# Patient Record
Sex: MALE | Race: WHITE | Employment: OTHER | RURAL
[De-identification: names, ages, dates, MRNs, and addresses within clinical notes are randomized per-mention and may not be internally consistent; named-entity substitution may affect disease eponyms.]

---

## 2017-05-25 ENCOUNTER — OFFICE VISIT (OUTPATIENT)
Dept: FAMILY MEDICINE CLINIC | Age: 82
End: 2017-05-25

## 2017-05-25 VITALS
SYSTOLIC BLOOD PRESSURE: 122 MMHG | WEIGHT: 146 LBS | RESPIRATION RATE: 18 BRPM | BODY MASS INDEX: 24.32 KG/M2 | HEIGHT: 65 IN | OXYGEN SATURATION: 98 % | HEART RATE: 69 BPM | TEMPERATURE: 97.2 F | DIASTOLIC BLOOD PRESSURE: 70 MMHG

## 2017-05-25 DIAGNOSIS — Z00.00 ROUTINE GENERAL MEDICAL EXAMINATION AT A HEALTH CARE FACILITY: ICD-10-CM

## 2017-05-25 DIAGNOSIS — M79.604 RIGHT LEG PAIN: Primary | ICD-10-CM

## 2017-05-25 DIAGNOSIS — Z13.39 SCREENING FOR ALCOHOLISM: ICD-10-CM

## 2017-05-25 DIAGNOSIS — I10 ESSENTIAL HYPERTENSION: Chronic | ICD-10-CM

## 2017-05-25 NOTE — MR AVS SNAPSHOT
Visit Information Date & Time Provider Department Dept. Phone Encounter #  
 5/25/2017  3:20 PM Jennifer Uribe MD [facility] Primary Care [phone] [id number] Upcoming Health Maintenance Date Due  
 EYE EXAM RETINAL OR DILATED Q1 3/20/1935 HEMOGLOBIN A1C Q6M 3/15/2016 MICROALBUMIN Q1 9/15/2016 LIPID PANEL Q1 9/15/2016 Pneumococcal 65+ High/Highest Risk (2 of 2 - PPSV23) 7/20/2017 INFLUENZA AGE 9 TO ADULT 8/1/2017 FOOT EXAM Q1 5/25/2018 MEDICARE YEARLY EXAM 5/26/2018 GLAUCOMA SCREENING Q2Y 5/25/2019 DTaP/Tdap/Td series (2 - Td) 5/25/2027 Allergies as of 5/25/2017  Review Complete On: 5/25/2017 By: Jennifer Uribe MD  
 No Known Allergies Current Immunizations  Never Reviewed No immunizations on file. Not reviewed this visit You Were Diagnosed With   
  
 Codes Comments Right leg pain    -  Primary ICD-10-CM: M79.604 ICD-9-CM: 729.5 Routine general medical examination at a health care facility     ICD-10-CM: Z00.00 ICD-9-CM: V70.0 Screening for alcoholism     ICD-10-CM: Z13.89 ICD-9-CM: V79.1 Essential hypertension     ICD-10-CM: I10 
ICD-9-CM: 401.9 Solitary left kidney     ICD-10-CM: Q60.0 ICD-9-CM: 753.0 Vitals BP Pulse Temp Resp Height(growth percentile) Weight(growth percentile) 122/70 69 97.2 °F (36.2 °C) 18 5' 5\" (1.651 m) 146 lb (66.2 kg) SpO2 BMI Smoking Status 98% 24.3 kg/m2 Never Smoker BMI and BSA Data Body Mass Index Body Surface Area  
 24.3 kg/m 2 1.74 m 2 Preferred Pharmacy Pharmacy Name Phone [facility] [address] [facility], [address] [place] [address] Adrian Rudd 101 Your Updated Medication List  
  
   
This list is accurate as of: 5/25/17  3:42 PM.  Always use your most recent med list.  
  
  
  
  
 aspirin 81 mg tablet Take 81 mg by mouth daily. azithromycin 250 mg tablet Commonly known as:  Yokasta Rome Take 2 tablets today, then take 1 tablet daily  
  
 finasteride 5 mg tablet Commonly known as:  PROSCAR Take 1 Tab by mouth daily. lisinopril-hydroCHLOROthiazide 20-12.5 mg per tablet Commonly known as:  Deena Rower Take 1 Tab by mouth daily. Needs follow up and lab We Performed the Following METABOLIC PANEL, COMPREHENSIVE [53961 CPT(R)] SED RATE (ESR) [id number] CPT(R)] To-Do List   
 Around 05/25/2017 Imaging:  XR HIP RT W OR WO PELV 2-3 VWS Introducing hospitals & HEALTH SERVICES! Rhodachilo Figueroa introduces [employer] patient portal. Now you can access parts of your medical record, email your doctor's office, and request medication refills online. 1. In your internet browser, go to [email or web address] 2. Click on the First Time User? Click Here link in the Sign In box. You will see the New Member Sign Up page. 3. Enter your [employer] Access Code exactly as it appears below. You will not need to use this code after youve completed the sign-up process. If you do not sign up before the expiration date, you must request a new code. · [employer] Access Code: [email or web address] Expires: 6/19/2017  1:57 PM 
 
4. Enter the last four digits of your Social Security Number (xxxx) and Date of Birth (mm/dd/yyyy) as indicated and click Submit. You will be taken to the next sign-up page. 5. Create a [employer] ID. This will be your [employer] login ID and cannot be changed, so think of one that is secure and easy to remember. 6. Create a [employer] password. You can change your password at any time. 7. Enter your Password Reset Question and Answer. This can be used at a later time if you forget your password. 8. Enter your e-mail address. You will receive e-mail notification when new information is available in [address] 9. Click Sign Up. You can now view and download portions of your medical record. 10. Click the Download Summary menu link to download a portable copy of your medical information. If you have questions, please visit the Frequently Asked Questions section of the [employer] website. Remember, [employer] is NOT to be used for urgent needs. For medical emergencies, dial 911. Now available from your iPhone and Android! Please provide this summary of care documentation to your next provider. Your primary care clinician is listed as Rosa Maria Burger. If you have any questions after today's visit, please call [phone].

## 2017-05-25 NOTE — PROGRESS NOTES
This is an Initial Medicare Annual Wellness Exam (AWV) (Performed 12 months after IPPE or effective date of Medicare Part B enrollment, Once in a lifetime)    I have reviewed the patient's medical history in detail and updated the computerized patient record. History     Past Medical History:   Diagnosis Date    BPH (benign prostatic hyperplasia)     Diabetes ([facility] [address]     Hypertension     Left renal mass - renal cell Ca - removed kidney       Past Surgical History:   Procedure Laterality Date    HX CATARACT REMOVAL      bilat    HX CYST REMOVAL      right chest.    HX NEPHRECTOMY  2009    Dr Anh Stewart, left renal CA     Current Outpatient Prescriptions   Medication Sig Dispense Refill    lisinopril-hydroCHLOROthiazide (PRINZIDE, ZESTORETIC) 20-12.5 mg per tablet Take 1 Tab by mouth daily. Needs follow up and lab 30 Tab 0    finasteride (PROSCAR) 5 mg tablet Take 1 Tab by mouth daily. 30 Tab 5    aspirin 81 mg tablet Take 81 mg by mouth daily.  azithromycin (ZITHROMAX) 250 mg tablet Take 2 tablets today, then take 1 tablet daily 6 Tab 0     No Known Allergies  Family History   Problem Relation Age of Onset    Stroke Father      Social History   Substance Use Topics    Smoking status: Never Smoker    Smokeless tobacco: Not on file    Alcohol use No     Patient Active Problem List   Diagnosis Code    HTN (hypertension) I10    Hyperlipidemia E78.5         Depression Risk Factor Screening:     PHQ over the last two weeks 10/27/2015   Little interest or pleasure in doing things Not at all   Feeling down, depressed or hopeless Not at all   Total Score PHQ 2 0     Alcohol Risk Factor Screening: On any occasion during the past 3 months, have you had more than 4 drinks containing alcohol? No    Do you average more than 14 drinks per week? No    Functional Ability and Level of Safety:     Functional Ability:   Does the patient exhibit a steady gait?   {gen no default/yes/free text:[id number]::yes How long did it take the patient to get up and walk from a sitting position? 2sec   Is the patient self reliant?  (ie can do own laundry, meals, household chores)  yes     Does the patient handle his/her own medications? yes     Does the patient handle his/her own money? yes     Is the patients home safe (ie good lighting, handrails on stairs and bath, etc.)? yes     Did you notice or did patient express any hearing difficulties? no     Did you notice or did patient express any vision difficulties?   no     Were distance and reading eye charts used? no       Advance Care Planning:   Patient was offered the opportunity to discuss advance care planning:  yes     Does patient have an Advance Directive:  yes   If no, did you provide information on Caring Connections? yes     ADL Assessment 5/25/2017   Feeding yourself No Help Needed   Getting from bed to chair No Help Needed   Getting dressed No Help Needed   Bathing or showering No Help Needed   Walk across the room (includes cane/walker) No Help Needed   Using the telphone No Help Needed   Taking your medications No Help Needed   Preparing meals No Help Needed   Managing money (expenses/bills) No Help Needed   Moderately strenuous housework (laundry) No Help Needed   Shopping for personal items (toiletries/medicines) No Help Needed   Shopping for groceries No Help Needed   Driving No Help Needed   Climbing a flight of stairs No Help Needed   Getting to places beyond walking distances No Help Needed         Hearing Loss   None to minor    Activities of Daily Living   Self-care. Requires assistance with: no ADLs    Fall Risk     Fall Risk Assessment, last 12 mths 5/25/2017   Able to walk? Yes   Fall in past 12 months?  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 He points to right groin as site of his pain. It occurs off and on and causes him to limp on the right side sometimes. No injury, falls or missteps that he can recall. Today the pain \"has all cleared up. \"  He thinks it is worse in the mornings. It may \"loosen up some\" as the day goes on but he is unsure. He son report he limps off and on throughout the day. He is wondering if it has to do with his \"kidney operation\" many years ago or to aneurysm that was found many years ago. HTN:  Stable. Taking meds. No side effects. BPH: no current sx except nocturia once or twice a night. Current Outpatient Prescriptions   Medication Sig Dispense Refill    lisinopril-hydroCHLOROthiazide (PRINZIDE, ZESTORETIC) 20-12.5 mg per tablet Take 1 Tab by mouth daily. Needs follow up and lab 30 Tab 0    finasteride (PROSCAR) 5 mg tablet Take 1 Tab by mouth daily. 30 Tab 5    aspirin 81 mg tablet Take 81 mg by mouth daily.       azithromycin (ZITHROMAX) 250 mg tablet Take 2 tablets today, then take 1 tablet daily 6 Tab 0           Past Medical History:   Diagnosis Date    BPH (benign prostatic hyperplasia)     Diabetes ([address]     Hypertension     Left renal mass      Past Surgical History:   Procedure Laterality Date    HX CATARACT REMOVAL      bilat    HX CYST REMOVAL      right chest.    HX NEPHRECTOMY  2009    Dr Darrel Reddy, left renal CA     No Known Allergies  ROS  General ROS: negative  Cardiovascular ROS: no chest pain or dyspnea on exertion  Respiratory ROS: no cough, shortness of breath, or wheezing  Neurological ROS: no TIA or stroke symptoms  Gastrointestinal ROS: no abdominal pain, change in bowel habits, or black or bloody stools  Genito-Urinary ROS: no dysuria, trouble voiding, or hematuria. Nocturia 1-2 x per night. Results for orders placed or performed during the hospital encounter of 03/21/17   PROSTATE SPECIFIC AG (PSA)   Result Value Ref Range    Prostate Specific Ag 25.5 (H) 0.0 - 4.0 ng/mL   CBC WITH AUTOMATED DIFF   Result Value Ref Range    WBC 8.8 4.1 - 11.1 K/uL    RBC 5.17 4.10 - 5.70 M/uL    HGB 14.4 12.1 - 17.0 g/dL    HCT 44.2 36.6 - 50.3 %    MCV 85.5 80.0 - 99.0 FL    MCH 27.9 26.0 - 34.0 PG    MCHC 32.6 30.0 - 36.5 g/dL    RDW 15.1 (H) 11.5 - 14.5 %    PLATELET 967 564 - 302 K/uL    NEUTROPHILS 66 32 - 75 %    LYMPHOCYTES 20 12 - 49 %    MONOCYTES 9 5 - 13 %    EOSINOPHILS 4 0 - 7 %    BASOPHILS 1 0 - 1 %    ABS. NEUTROPHILS 5.8 1.8 - 8.0 K/UL    ABS. LYMPHOCYTES 1.8 0.8 - 3.5 K/UL    ABS. MONOCYTES 0.8 0.0 - 1.0 K/UL    ABS. EOSINOPHILS 0.4 0.0 - 0.4 K/UL    ABS. BASOPHILS 0.1 0.0 - 0.1 K/UL   ALK PHOS   Result Value Ref Range    Alk. phosphatase 64 45 - 117 U/L      Lab Results   Component Value Date/Time    Hemoglobin A1c 7.7 09/15/2015 03:03 PM     Lab Results   Component Value Date/Time    Microalbumin, urine 101.9 09/15/2015 03:03 PM     No results for input(s): NA, K, CL, CO2, GLU, BUN, CREA, CA, MG, PHOS, ALB, TBIL, TBILI, SGOT, ALT, INR in the last 72 hours. No lab exists for component: INREXT, INREXT      EXAM:  Visit Vitals    /70    Pulse 69    Temp 97.2 °F (36.2 °C)    Resp 18    Ht 5' 5\" (1.651 m)    Wt 146 lb (66.2 kg)    SpO2 98%    BMI 24.3 kg/m2      Physical Exam  General:  Alert, cooperative, no distress, appears stated age. Head:  Normocephalic, without obvious abnormality, atraumatic. Eyes:  Conjunctivae/corneas clear. EOMs intact. Throat: Lips, mucosa, and tongue moist..   Neck: Supple, symmetrical, trachea midline, no adenopathy, thyroid: no enlargement/tenderness/nodules, no carotid bruit and no JVD. Back:   Symmetric, no curvature. ROM normal. No CVA tenderness.    Lungs:   Clear to auscultation bilaterally. Heart:  Regular rate and rhythm,  no murmur, click, rub or gallop. Abdomen:   Soft, non-tender. Bowel sounds normal. No masses,  No organomegaly. No hernias. Extremities: no cyanosis or edema. No calf tenderness or cords. Painful to internally and externally rotate right hip. Left hip ok. No heat or crepitus. Pulses: 2+ and symmetric all extremities. No groin aneurysm and normal femoral pulses. Skin: Skin color, texture, turgor normal. No rashes or lesions   Neurologic: CNII-XII intact. Alert and oriented X 3. Fine motor of hands and fingers normal.   equal.  No cogwheeling or rigidity. Sensation grossly normal to touch. Gross motor of extremities normal.         Diagnoses and all orders for this visit:    Right leg pain  -     XR HIP RT W OR WO PELV 2-3 VWS; Future  -     SED RATE (ESR)  -     COLLECTION VENOUS BLOOD,VENIPUNCTURE    Routine general medical examination at a health care facility  -     COLLECTION VENOUS BLOOD,VENIPUNCTURE    Screening for alcoholism    Essential hypertension  -     COLLECTION VENOUS BLOOD,VENIPUNCTURE    Solitary left kidney  -     METABOLIC PANEL, COMPREHENSIVE        Encounter Diagnoses     ICD-10-CM ICD-9-CM   1. Right leg pain - I suspect he has some moderate OA of right hip. Sparring use of NSAIDS once or twice a wk ok but for the most part use tylenol. Check x-ray. M79.604 729.5   2. Routine general medical examination at a health care facility Z00.00 V70.0   3. Screening for alcoholism Z13.89 V79.1   4. Essential hypertension - stable I10 401.9   5. 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   Q60.0 753.0        Orders Placed This Encounter    COLLECTION VENOUS BLOOD,VENIPUNCTURE    XR HIP RT W OR WO PELV 2-3 VWS     Standing Status:   Future     Number of Occurrences:   1     Standing Expiration Date:   6/25/2018     Order Specific Question:   Reason for Exam     Answer:   right hip pain     Order Specific Question:   Is Patient Allergic to Contrast Dye? Answer:   Unknown    METABOLIC PANEL, COMPREHENSIVE    SED RATE (ESR)         Follow-up Disposition:  Return in about 1 week (around 6/1/2017) INB, or sooner if symptoms worsen or fail to improve.       Juan Manuel Narayanan MD

## 2017-05-26 LAB
ALBUMIN SERPL-MCNC: 4.4 G/DL (ref 3.2–4.6)
ALBUMIN/GLOB SERPL: 1.8 {RATIO} (ref 1.2–2.2)
ALP SERPL-CCNC: 75 IU/L (ref 39–117)
ALT SERPL-CCNC: 10 IU/L (ref 0–44)
AST SERPL-CCNC: 14 IU/L (ref 0–40)
BILIRUB SERPL-MCNC: 0.8 MG/DL (ref 0–1.2)
BUN SERPL-MCNC: 26 MG/DL (ref 10–36)
BUN/CREAT SERPL: 18 (ref 10–24)
CALCIUM SERPL-MCNC: 9.7 MG/DL (ref 8.6–10.2)
CHLORIDE SERPL-SCNC: 99 MMOL/L (ref 96–106)
CO2 SERPL-SCNC: 20 MMOL/L (ref 18–29)
CREAT SERPL-MCNC: 1.42 MG/DL (ref 0.76–1.27)
ERYTHROCYTE [SEDIMENTATION RATE] IN BLOOD BY WESTERGREN METHOD: 18 MM/HR (ref 0–30)
GLOBULIN SER CALC-MCNC: 2.4 G/DL (ref 1.5–4.5)
GLUCOSE SERPL-MCNC: 154 MG/DL (ref 65–99)
INTERPRETATION: NORMAL
POTASSIUM SERPL-SCNC: 4.8 MMOL/L (ref 3.5–5.2)
PROT SERPL-MCNC: 6.8 G/DL (ref 6–8.5)
SODIUM SERPL-SCNC: 139 MMOL/L (ref 134–144)

## 2017-05-26 NOTE — PROGRESS NOTES
Lab ok for 92 except glucose still elevated - see if they have left over blood to run HbA1c - otherwise RTO for HbA1C

## 2017-05-28 LAB
HBA1C MFR BLD: 7.5 % (ref 4.8–5.6)
SPECIMEN STATUS REPORT, ROLRST: NORMAL

## 2018-01-24 ENCOUNTER — OFFICE VISIT (OUTPATIENT)
Dept: FAMILY MEDICINE CLINIC | Age: 83
End: 2018-01-24

## 2018-01-24 VITALS
WEIGHT: 153 LBS | HEIGHT: 65 IN | OXYGEN SATURATION: 98 % | BODY MASS INDEX: 25.49 KG/M2 | HEART RATE: 71 BPM | DIASTOLIC BLOOD PRESSURE: 76 MMHG | RESPIRATION RATE: 18 BRPM | TEMPERATURE: 95.7 F | SYSTOLIC BLOOD PRESSURE: 196 MMHG

## 2018-01-24 DIAGNOSIS — N40.1 BPH WITH OBSTRUCTION/LOWER URINARY TRACT SYMPTOMS: ICD-10-CM

## 2018-01-24 DIAGNOSIS — E11.9 CONTROLLED TYPE 2 DIABETES MELLITUS WITHOUT COMPLICATION, WITHOUT LONG-TERM CURRENT USE OF INSULIN (HCC): ICD-10-CM

## 2018-01-24 DIAGNOSIS — N13.8 BPH WITH OBSTRUCTION/LOWER URINARY TRACT SYMPTOMS: ICD-10-CM

## 2018-01-24 DIAGNOSIS — E78.00 PURE HYPERCHOLESTEROLEMIA: Chronic | ICD-10-CM

## 2018-01-24 DIAGNOSIS — L25.5 RHUS DERMATITIS: ICD-10-CM

## 2018-01-24 DIAGNOSIS — I10 ESSENTIAL HYPERTENSION: Primary | Chronic | ICD-10-CM

## 2018-01-24 PROBLEM — R73.9 ELEVATED BLOOD SUGAR: Status: ACTIVE | Noted: 2018-01-24

## 2018-01-24 RX ORDER — LISINOPRIL AND HYDROCHLOROTHIAZIDE 12.5; 2 MG/1; MG/1
1 TABLET ORAL DAILY
Qty: 90 TAB | Refills: 1 | Status: SHIPPED | OUTPATIENT
Start: 2018-01-24 | End: 2019-02-22 | Stop reason: SDUPTHER

## 2018-01-24 RX ORDER — PREDNISONE 20 MG/1
TABLET ORAL
Qty: 30 TAB | Refills: 0 | Status: SHIPPED | OUTPATIENT
Start: 2018-01-24 | End: 2019-02-22 | Stop reason: ALTCHOICE

## 2018-01-24 RX ORDER — CLOTRIMAZOLE AND BETAMETHASONE DIPROPIONATE 10; .64 MG/G; MG/G
CREAM TOPICAL
Qty: 45 G | Refills: 0 | Status: SHIPPED | OUTPATIENT
Start: 2018-01-24 | End: 2019-02-22 | Stop reason: ALTCHOICE

## 2018-01-24 RX ORDER — FINASTERIDE 5 MG/1
5 TABLET, FILM COATED ORAL DAILY
Qty: 90 TAB | Refills: 1 | Status: SHIPPED | OUTPATIENT
Start: 2018-01-24 | End: 2019-02-22 | Stop reason: SDUPTHER

## 2018-01-24 NOTE — MR AVS SNAPSHOT
[address] [phone] Patient: Giovanny Velasco MRN: [id number] MGN:[id number] Visit Information Date & Time Provider Department Dept. Phone Encounter #  
 1/24/2018  9:40 AM Corrinne Round., MD [address] [place] [address] [id number] Upcoming Health Maintenance Date Due  
 EYE EXAM RETINAL OR DILATED Q1 3/20/1935 MICROALBUMIN Q1 9/15/2016 LIPID PANEL Q1 9/15/2016 Pneumococcal 65+ High/Highest Risk (2 of 2 - PPSV23) 7/20/2017 HEMOGLOBIN A1C Q6M 11/25/2017 FOOT EXAM Q1 5/25/2018 MEDICARE YEARLY EXAM 5/26/2018 GLAUCOMA SCREENING Q2Y 5/25/2019 DTaP/Tdap/Td series (2 - Td) 5/25/2027 Allergies as of 1/24/2018  Review Complete On: 1/24/2018 By: Corrinne Round., MD  
 No Known Allergies Current Immunizations  Never Reviewed No immunizations on file. Not reviewed this visit You Were Diagnosed With   
  
 Codes Comments Essential hypertension    -  Primary ICD-10-CM: I10 
ICD-9-CM: 401.9 Pure hypercholesterolemia     ICD-10-CM: E78.00 ICD-9-CM: 272.0 Rhus dermatitis     ICD-10-CM: L23.7 ICD-9-CM: 692.6 BPH with obstruction/lower urinary tract symptoms     ICD-10-CM: N40.1, N13.8 ICD-9-CM: 600.01, 599.69 Controlled type 2 diabetes mellitus without complication, without long-term current use of insulin ([address]     ICD-10-CM: E11.9 ICD-9-CM: 250.00 Vitals BP Pulse Temp Resp Height(growth percentile) Weight(growth percentile) 196/76 (BP 1 Location: Left arm) 71 95.7 °F (35.4 °C) 18 5' 5\" (1.651 m) 153 lb (69.4 kg) SpO2 BMI Smoking Status 98% 25.46 kg/m2 Never Smoker Vitals History BMI and BSA Data Body Mass Index Body Surface Area  
 25.46 kg/m 2 1.78 m 2 Preferred Pharmacy Pharmacy Name Phone  [facility] [address] [facility] [phone] Your Updated Medication List  
  
   
This list is accurate as of: 18 10:49 AM.  Always use your most recent med list.  
  
  
  
  
 aspirin 81 mg tablet Take 81 mg by mouth daily. azithromycin 250 mg tablet Commonly known as:  Jitendra Simmonds Take 2 tablets today, then take 1 tablet daily  
  
 clotrimazole-betamethasone topical cream  
Commonly known as:  Myrtie Hint Apply to affected skin twice daily  
  
 finasteride 5 mg tablet Commonly known as:  PROSCAR Take 1 Tab by mouth daily. lisinopril-hydroCHLOROthiazide 20-12.5 mg per tablet Commonly known as:  Ricci Alex Take 1 Tab by mouth daily. Needs follow up and lab  
  
 predniSONE 20 mg tablet Commonly known as:  DELTASONE  
5 tablets day for days 1 through 4, then 4 tablets on day 5, then 3 tablets on day 6, then 2 tablets on day 7, then 1 tablet on day 8. Prescriptions Sent to Pharmacy Refills  
 predniSONE (DELTASONE) 20 mg tablet 0 Si tablets day for days 1 through 4, then 4 tablets on day 5, then 3 tablets on day 6, then 2 tablets on day 7, then 1 tablet on day 8. Class: Normal  
 [facility]acy: [id number] [address] [place] [address] Ph #: [phone]  
 clotrimazole-betamethasone (LOTRISONE) topical cream 0 Sig: Apply to affected skin twice daily Class: Normal  
 [facility]acy: [id number] [address] [place] [address] Ph #: [phone]  
 lisinopril-hydroCHLOROthiazide (PRINZIDE, ZESTORETIC) 20-12.5 mg per tablet 1 Sig: Take 1 Tab by mouth daily. Needs follow up and lab Class: Normal  
 Pharmacy: [id number], [facility] Ph #: [phone] Route: Oral  
 finasteride (PROSCAR) 5 mg tablet 1 Sig: Take 1 Tab by mouth daily. Class: Normal  
 Pharmacy: [id number], [facility] Ph #: [phone]  Route: Oral  
  
 We Performed the Following HEMOGLOBIN A1C WITH EAG [30316 CPT(R)] LIPID PANEL [19196 CPT(R)] METABOLIC PANEL, COMPREHENSIVE [64530 CPT(R)] MICROALBUMIN, UR, RAND W/ MICROALBUMIN/CREA RATIO [id number] CPT(R)] Introducing \[facility]\"" & HEALTH SERVICES! David Lezama introduces [employer] patient portal. Now you can access parts of your medical record, email your doctor's office, and request medication refills online. 1. In your internet browser, go to [email or web address] 2. Click on the First Time User? Click Here link in the Sign In box. You will see the New Member Sign Up page. 3. Enter your [employer] Access Code exactly as it appears below. You will not need to use this code after youve completed the sign-up process. If you do not sign up before the expiration date, you must request a new code. · [employer] Access Code: [email or web address] Expires: 4/24/2018 10:49 AM 
 
4. Enter the last four digits of your Social Security Number (xxxx) and Date of Birth (mm/dd/yyyy) as indicated and click Submit. You will be taken to the next sign-up page. 5. Create a [employer] ID. This will be your [employer] login ID and cannot be changed, so think of one that is secure and easy to remember. 6. Create a [employer] password. You can change your password at any time. 7. Enter your Password Reset Question and Answer. This can be used at a later time if you forget your password. 8. Enter your e-mail address. You will receive e-mail notification when new information is available in [address] 9. Click Sign Up. You can now view and download portions of your medical record. 10. Click the Download Summary menu link to download a portable copy of your medical information. If you have questions, please visit the Frequently Asked Questions section of the [employer] website. Remember, [employer] is NOT to be used for urgent needs. For medical emergencies, dial 911. Now available from your iPhone and Android! Please provide this summary of care documentation to your next provider. Your primary care clinician is listed as Kamille Walton. If you have any questions after today's visit, please call [phone].

## 2018-01-24 NOTE — PROGRESS NOTES
Buck Friedman is a 80 y.o. male who presents with the following:  Chief Complaint   Patient presents with    Rash     left calf    Hypertension    Urinary Frequency       Rash    The history is provided by the patient (Patient was cleaning off a lot and developed a rash on his left calf which is pruritic). Pertinent negatives include no itching. Hypertension    The history is provided by the patient (Patient's blood pressure is elevated as is out of his medicines at this time but is having no chest pain or shortness of breath nor edema). Pertinent negatives include no chest pain, no orthopnea, no palpitations, no malaise/fatigue, no blurred vision, no headaches, no tinnitus, no dizziness and no shortness of breath. Urinary Frequency    The history is provided by the patient (Patient's urinary frequency is from his enlarged prostate which is been helped with the finasteride with less urgency and better stream). Associated symptoms include frequency. Pertinent negatives include no chills, no urgency and no abdominal pain. Diabetes   The history is provided by the patient (Patient's diabetes has been doing well off of medication up to this point time. His last A1c however was 7.5. He is having no chest pain shortness of breath nor numbness or tingling). Pertinent negatives include no chest pain, no abdominal pain, no headaches and no shortness of breath. Cholesterol Problem   The history is provided by the patient (The patient has been having elevated lipids in the past but is on no medication at this time it is having no chest pain or cardiac symptoms. ). Pertinent negatives include no chest pain, no abdominal pain, no headaches and no shortness of breath.        No Known Allergies    Current Outpatient Prescriptions   Medication Sig    predniSONE (DELTASONE) 20 mg tablet 5 tablets day for days 1 through 4, then 4 tablets on day 5, then 3 tablets on day 6, then 2 tablets on day 7, then 1 tablet on day 8.    clotrimazole-betamethasone (LOTRISONE) topical cream Apply to affected skin twice daily    lisinopril-hydroCHLOROthiazide (PRINZIDE, ZESTORETIC) 20-12.5 mg per tablet Take 1 Tab by mouth daily. Needs follow up and lab    finasteride (PROSCAR) 5 mg tablet Take 1 Tab by mouth daily.  aspirin 81 mg tablet Take 81 mg by mouth daily.  azithromycin (ZITHROMAX) 250 mg tablet Take 2 tablets today, then take 1 tablet daily     No current facility-administered medications for this visit. Past Medical History:   Diagnosis Date    BPH (benign prostatic hyperplasia)     Diabetes ([facility] [address]     Hypertension     Left renal mass        Past Surgical History:   Procedure Laterality Date    HX CATARACT REMOVAL      bilat    HX CYST REMOVAL      right chest.    HX NEPHRECTOMY  2009    Dr Madi Perez, left renal CA       Family History   Problem Relation Age of Onset    Stroke Father        Social History     Social History    Marital status:      Spouse name: N/A    Number of children: N/A    Years of education: N/A     Social History Main Topics    Smoking status: Never Smoker    Smokeless tobacco: Never Used    Alcohol use No    Drug use: No    Sexual activity: Not on file     Other Topics Concern    Not on file     Social History Narrative       Review of Systems   Constitutional: Negative for chills, fever, malaise/fatigue and weight loss. HENT: Negative for congestion, hearing loss, sore throat and tinnitus. Eyes: Negative for blurred vision, pain and discharge. Respiratory: Negative for cough, shortness of breath and wheezing. Cardiovascular: Negative for chest pain, palpitations, orthopnea, claudication and leg swelling. Gastrointestinal: Negative for abdominal pain, constipation and heartburn. Genitourinary: Positive for frequency. Negative for dysuria and urgency. Musculoskeletal: Negative for falls, joint pain and myalgias. Skin: Positive for rash.  Negative for itching. Neurological: Negative for dizziness, tingling, tremors and headaches. Endo/Heme/Allergies: Negative for environmental allergies and polydipsia. Psychiatric/Behavioral: Negative for depression and substance abuse. The patient is not nervous/anxious. Visit Vitals    /76 (BP 1 Location: Left arm)    Pulse 71    Temp 95.7 °F (35.4 °C)    Resp 18    Ht 5' 5\" (1.651 m)    Wt 153 lb (69.4 kg)    SpO2 98%    BMI 25.46 kg/m2     Physical Exam   Constitutional: He is oriented to person, place, and time and well-developed, well-nourished, and in no distress. HENT:   Head: Normocephalic and atraumatic. Nose: Nose normal.   Mouth/Throat: Oropharynx is clear and moist.   Eyes: Conjunctivae and EOM are normal. Pupils are equal, round, and reactive to light. Neck: Normal range of motion. Neck supple. No JVD present. No tracheal deviation present. No thyromegaly present. Cardiovascular: Normal rate, regular rhythm, normal heart sounds and intact distal pulses. Exam reveals no gallop and no friction rub. No murmur heard. Pulmonary/Chest: Effort normal and breath sounds normal. No respiratory distress. He has no wheezes. He has no rales. He exhibits no tenderness. Abdominal: Soft. Bowel sounds are normal. He exhibits no distension and no mass. There is no tenderness. There is no rebound and no guarding. Musculoskeletal: Normal range of motion. He exhibits no edema or tenderness. Lymphadenopathy:     He has no cervical adenopathy. Neurological: He is alert and oriented to person, place, and time. He has normal reflexes. No cranial nerve deficit. He exhibits normal muscle tone. Gait normal. Coordination normal.   Skin: Skin is warm and dry. Rash noted. There is erythema. Patient with a circular Lamar dermatitis type rash on the inner aspect of his left calf and lesser lesions on the right calf and further up the leg to the popliteal area on the left there is a smaller patch. Psychiatric: Mood, memory, affect and judgment normal.   Vitals reviewed. ICD-10-CM ICD-9-CM    1. Essential hypertension Z55 674.4 METABOLIC PANEL, COMPREHENSIVE      lisinopril-hydroCHLOROthiazide (PRINZIDE, ZESTORETIC) 20-12.5 mg per tablet   2. Pure hypercholesterolemia E78.00 272.0 LIPID PANEL   3. Rhus dermatitis L23.7 692.6 predniSONE (DELTASONE) 20 mg tablet      clotrimazole-betamethasone (LOTRISONE) topical cream   4. BPH with obstruction/lower urinary tract symptoms N40.1 600.01 finasteride (PROSCAR) 5 mg tablet    N13.8 599.69    5. Controlled type 2 diabetes mellitus without complication, without long-term current use of insulin ([facility]) Z90.8 653.05 METABOLIC PANEL, COMPREHENSIVE      LIPID PANEL      HEMOGLOBIN A1C WITH EAG      MICROALBUMIN, UR, RAND W/ MICROALBUMIN/CREA RATIO       Orders Placed This Encounter    METABOLIC PANEL, COMPREHENSIVE    LIPID PANEL    HEMOGLOBIN A1C WITH EAG    MICROALBUMIN, UR, RAND W/ MICROALBUMIN/CREA RATIO    predniSONE (DELTASONE) 20 mg tablet     Si tablets day for days 1 through 4, then 4 tablets on day 5, then 3 tablets on day 6, then 2 tablets on day 7, then 1 tablet on day 8. Dispense:  30 Tab     Refill:  0    clotrimazole-betamethasone (LOTRISONE) topical cream     Sig: Apply to affected skin twice daily     Dispense:  45 g     Refill:  0    lisinopril-hydroCHLOROthiazide (PRINZIDE, ZESTORETIC) 20-12.5 mg per tablet     Sig: Take 1 Tab by mouth daily. Needs follow up and lab     Dispense:  90 Tab     Refill:  1    finasteride (PROSCAR) 5 mg tablet     Sig: Take 1 Tab by mouth daily.      Dispense:  90 Tab     Refill:  1       Follow-up Disposition: Not on Conor Crouch MD

## 2018-01-26 LAB
ALBUMIN SERPL-MCNC: 4.1 G/DL (ref 3.2–4.6)
ALBUMIN/CREAT UR: 94.6 MG/G CREAT (ref 0–30)
ALBUMIN/GLOB SERPL: 1.8 {RATIO} (ref 1.2–2.2)
ALP SERPL-CCNC: 60 IU/L (ref 39–117)
ALT SERPL-CCNC: 9 IU/L (ref 0–44)
AST SERPL-CCNC: 13 IU/L (ref 0–40)
BILIRUB SERPL-MCNC: 0.9 MG/DL (ref 0–1.2)
BUN SERPL-MCNC: 17 MG/DL (ref 10–36)
BUN/CREAT SERPL: 13 (ref 10–24)
CALCIUM SERPL-MCNC: 9.4 MG/DL (ref 8.6–10.2)
CHLORIDE SERPL-SCNC: 104 MMOL/L (ref 96–106)
CHOLEST SERPL-MCNC: 164 MG/DL (ref 100–199)
CO2 SERPL-SCNC: 23 MMOL/L (ref 18–29)
CREAT SERPL-MCNC: 1.34 MG/DL (ref 0.76–1.27)
CREAT UR-MCNC: 65 MG/DL
EST. AVERAGE GLUCOSE BLD GHB EST-MCNC: 160 MG/DL
GFR SERPLBLD CREATININE-BSD FMLA CKD-EPI: 46 ML/MIN/1.73
GFR SERPLBLD CREATININE-BSD FMLA CKD-EPI: 53 ML/MIN/1.73
GLOBULIN SER CALC-MCNC: 2.3 G/DL (ref 1.5–4.5)
GLUCOSE SERPL-MCNC: 141 MG/DL (ref 65–99)
HBA1C MFR BLD: 7.2 % (ref 4.8–5.6)
HDLC SERPL-MCNC: 36 MG/DL
INTERPRETATION, 910389: NORMAL
INTERPRETATION: NORMAL
LDLC SERPL CALC-MCNC: 83 MG/DL (ref 0–99)
MICROALBUMIN UR-MCNC: 61.5 UG/ML
PDF IMAGE, 910387: NORMAL
POTASSIUM SERPL-SCNC: 4.6 MMOL/L (ref 3.5–5.2)
PROT SERPL-MCNC: 6.4 G/DL (ref 6–8.5)
SODIUM SERPL-SCNC: 141 MMOL/L (ref 134–144)
TRIGL SERPL-MCNC: 227 MG/DL (ref 0–149)
VLDLC SERPL CALC-MCNC: 45 MG/DL (ref 5–40)

## 2019-02-22 PROBLEM — C61 PROSTATE CANCER (HCC): Status: ACTIVE | Noted: 2019-02-22

## 2020-06-11 PROBLEM — M89.9 LYTIC BONE LESION OF LEFT FEMUR: Status: ACTIVE | Noted: 2020-06-11

## 2020-06-16 PROBLEM — S72.302A CLOSED FRACTURE OF SHAFT OF LEFT FEMUR, INITIAL ENCOUNTER (HCC): Status: ACTIVE | Noted: 2020-06-16

## 2020-06-16 PROBLEM — S72.302A CLOSED FRACTURE OF SHAFT OF LEFT FEMUR, INITIAL ENCOUNTER (HCC): Status: RESOLVED | Noted: 2020-06-16 | Resolved: 2020-06-16

## 2020-06-16 PROBLEM — C64.2 RENAL CELL CARCINOMA OF LEFT KIDNEY (HCC): Status: ACTIVE | Noted: 2020-06-16

## 2020-06-17 PROBLEM — I71.40 ABDOMINAL AORTIC ANEURYSM (AAA) 3.0 CM TO 5.5 CM IN DIAMETER IN MALE: Status: ACTIVE | Noted: 2020-06-17

## 2020-06-17 PROBLEM — S72.302A CLOSED FRACTURE OF SHAFT OF LEFT FEMUR (HCC): Status: ACTIVE | Noted: 2020-06-17

## 2020-06-19 ENCOUNTER — HOME HEALTH ADMISSION (OUTPATIENT)
Dept: HOME HEALTH SERVICES | Facility: HOME HEALTH | Age: 85
End: 2020-06-19
Payer: MEDICARE

## 2020-06-20 PROBLEM — S72.302A CLOSED FRACTURE OF SHAFT OF LEFT FEMUR (HCC): Status: RESOLVED | Noted: 2020-06-17 | Resolved: 2020-06-20

## 2020-06-21 ENCOUNTER — HOME CARE VISIT (OUTPATIENT)
Dept: HOME HEALTH SERVICES | Facility: HOME HEALTH | Age: 85
End: 2020-06-21

## 2020-06-24 ENCOUNTER — HOME CARE VISIT (OUTPATIENT)
Dept: SCHEDULING | Facility: HOME HEALTH | Age: 85
End: 2020-06-24
Payer: MEDICARE

## 2020-06-24 ENCOUNTER — HOME CARE VISIT (OUTPATIENT)
Dept: HOME HEALTH SERVICES | Facility: HOME HEALTH | Age: 85
End: 2020-06-24
Payer: MEDICARE

## 2020-06-24 PROCEDURE — G0151 HHCP-SERV OF PT,EA 15 MIN: HCPCS

## 2020-06-24 PROCEDURE — 3331090002 HH PPS REVENUE DEBIT

## 2020-06-24 PROCEDURE — 400013 HH SOC

## 2020-06-24 PROCEDURE — G0299 HHS/HOSPICE OF RN EA 15 MIN: HCPCS

## 2020-06-24 PROCEDURE — 3331090001 HH PPS REVENUE CREDIT

## 2020-06-25 ENCOUNTER — HOME CARE VISIT (OUTPATIENT)
Dept: HOME HEALTH SERVICES | Facility: HOME HEALTH | Age: 85
End: 2020-06-25
Payer: MEDICARE

## 2020-06-25 ENCOUNTER — HOME CARE VISIT (OUTPATIENT)
Dept: SCHEDULING | Facility: HOME HEALTH | Age: 85
End: 2020-06-25
Payer: MEDICARE

## 2020-06-25 VITALS
TEMPERATURE: 97.2 F | SYSTOLIC BLOOD PRESSURE: 160 MMHG | RESPIRATION RATE: 12 BRPM | DIASTOLIC BLOOD PRESSURE: 66 MMHG | OXYGEN SATURATION: 95 % | HEART RATE: 78 BPM

## 2020-06-25 VITALS
OXYGEN SATURATION: 97 % | RESPIRATION RATE: 20 BRPM | HEART RATE: 62 BPM | SYSTOLIC BLOOD PRESSURE: 134 MMHG | TEMPERATURE: 98.2 F | DIASTOLIC BLOOD PRESSURE: 72 MMHG

## 2020-06-25 VITALS
OXYGEN SATURATION: 95 % | HEART RATE: 72 BPM | TEMPERATURE: 98 F | DIASTOLIC BLOOD PRESSURE: 74 MMHG | SYSTOLIC BLOOD PRESSURE: 134 MMHG

## 2020-06-25 PROCEDURE — G0152 HHCP-SERV OF OT,EA 15 MIN: HCPCS

## 2020-06-25 PROCEDURE — 3331090001 HH PPS REVENUE CREDIT

## 2020-06-25 PROCEDURE — 3331090002 HH PPS REVENUE DEBIT

## 2020-06-26 ENCOUNTER — HOME CARE VISIT (OUTPATIENT)
Dept: HOME HEALTH SERVICES | Facility: HOME HEALTH | Age: 85
End: 2020-06-26
Payer: MEDICARE

## 2020-06-26 ENCOUNTER — HOME CARE VISIT (OUTPATIENT)
Dept: SCHEDULING | Facility: HOME HEALTH | Age: 85
End: 2020-06-26
Payer: MEDICARE

## 2020-06-26 PROCEDURE — 3331090001 HH PPS REVENUE CREDIT

## 2020-06-26 PROCEDURE — 3331090002 HH PPS REVENUE DEBIT

## 2020-06-26 PROCEDURE — G0299 HHS/HOSPICE OF RN EA 15 MIN: HCPCS

## 2020-06-27 PROCEDURE — 3331090001 HH PPS REVENUE CREDIT

## 2020-06-27 PROCEDURE — 3331090002 HH PPS REVENUE DEBIT

## 2020-06-28 PROCEDURE — 3331090002 HH PPS REVENUE DEBIT

## 2020-06-28 PROCEDURE — 3331090001 HH PPS REVENUE CREDIT

## 2020-06-29 ENCOUNTER — HOME CARE VISIT (OUTPATIENT)
Dept: SCHEDULING | Facility: HOME HEALTH | Age: 85
End: 2020-06-29
Payer: MEDICARE

## 2020-06-29 ENCOUNTER — HOME CARE VISIT (OUTPATIENT)
Dept: HOME HEALTH SERVICES | Facility: HOME HEALTH | Age: 85
End: 2020-06-29
Payer: MEDICARE

## 2020-06-29 VITALS
OXYGEN SATURATION: 97 % | TEMPERATURE: 98.2 F | DIASTOLIC BLOOD PRESSURE: 68 MMHG | RESPIRATION RATE: 20 BRPM | HEART RATE: 60 BPM | SYSTOLIC BLOOD PRESSURE: 122 MMHG

## 2020-06-29 PROCEDURE — G0151 HHCP-SERV OF PT,EA 15 MIN: HCPCS

## 2020-06-29 PROCEDURE — 3331090002 HH PPS REVENUE DEBIT

## 2020-06-29 PROCEDURE — 3331090001 HH PPS REVENUE CREDIT

## 2020-06-30 ENCOUNTER — HOME CARE VISIT (OUTPATIENT)
Dept: SCHEDULING | Facility: HOME HEALTH | Age: 85
End: 2020-06-30
Payer: MEDICARE

## 2020-06-30 ENCOUNTER — HOME CARE VISIT (OUTPATIENT)
Dept: HOME HEALTH SERVICES | Facility: HOME HEALTH | Age: 85
End: 2020-06-30
Payer: MEDICARE

## 2020-06-30 VITALS
DIASTOLIC BLOOD PRESSURE: 89 MMHG | RESPIRATION RATE: 18 BRPM | OXYGEN SATURATION: 96 % | SYSTOLIC BLOOD PRESSURE: 149 MMHG | TEMPERATURE: 97.6 F | HEART RATE: 74 BPM

## 2020-06-30 VITALS
DIASTOLIC BLOOD PRESSURE: 79 MMHG | TEMPERATURE: 97.8 F | SYSTOLIC BLOOD PRESSURE: 146 MMHG | HEART RATE: 50 BPM | OXYGEN SATURATION: 96 %

## 2020-06-30 PROCEDURE — 3331090002 HH PPS REVENUE DEBIT

## 2020-06-30 PROCEDURE — G0152 HHCP-SERV OF OT,EA 15 MIN: HCPCS

## 2020-06-30 PROCEDURE — G0157 HHC PT ASSISTANT EA 15: HCPCS

## 2020-06-30 PROCEDURE — 3331090001 HH PPS REVENUE CREDIT

## 2020-07-01 PROCEDURE — 3331090001 HH PPS REVENUE CREDIT

## 2020-07-01 PROCEDURE — 3331090002 HH PPS REVENUE DEBIT

## 2020-07-02 ENCOUNTER — HOME CARE VISIT (OUTPATIENT)
Dept: SCHEDULING | Facility: HOME HEALTH | Age: 85
End: 2020-07-02
Payer: MEDICARE

## 2020-07-02 ENCOUNTER — HOME CARE VISIT (OUTPATIENT)
Dept: HOME HEALTH SERVICES | Facility: HOME HEALTH | Age: 85
End: 2020-07-02
Payer: MEDICARE

## 2020-07-02 VITALS
TEMPERATURE: 98.4 F | HEART RATE: 80 BPM | DIASTOLIC BLOOD PRESSURE: 95 MMHG | SYSTOLIC BLOOD PRESSURE: 114 MMHG | OXYGEN SATURATION: 98 %

## 2020-07-02 VITALS
OXYGEN SATURATION: 98 % | HEART RATE: 80 BPM | DIASTOLIC BLOOD PRESSURE: 95 MMHG | RESPIRATION RATE: 18 BRPM | SYSTOLIC BLOOD PRESSURE: 114 MMHG | TEMPERATURE: 98.4 F

## 2020-07-02 PROCEDURE — 3331090002 HH PPS REVENUE DEBIT

## 2020-07-02 PROCEDURE — G0152 HHCP-SERV OF OT,EA 15 MIN: HCPCS

## 2020-07-02 PROCEDURE — G0151 HHCP-SERV OF PT,EA 15 MIN: HCPCS

## 2020-07-02 PROCEDURE — 3331090001 HH PPS REVENUE CREDIT

## 2020-07-02 PROCEDURE — G0299 HHS/HOSPICE OF RN EA 15 MIN: HCPCS

## 2020-07-03 VITALS
DIASTOLIC BLOOD PRESSURE: 65 MMHG | HEART RATE: 77 BPM | SYSTOLIC BLOOD PRESSURE: 141 MMHG | OXYGEN SATURATION: 93 % | TEMPERATURE: 97.9 F

## 2020-07-03 PROCEDURE — 3331090001 HH PPS REVENUE CREDIT

## 2020-07-03 PROCEDURE — 3331090002 HH PPS REVENUE DEBIT

## 2020-07-04 PROCEDURE — 3331090002 HH PPS REVENUE DEBIT

## 2020-07-04 PROCEDURE — 3331090001 HH PPS REVENUE CREDIT

## 2020-07-05 PROCEDURE — 3331090002 HH PPS REVENUE DEBIT

## 2020-07-05 PROCEDURE — 3331090001 HH PPS REVENUE CREDIT

## 2020-07-06 ENCOUNTER — HOME CARE VISIT (OUTPATIENT)
Dept: SCHEDULING | Facility: HOME HEALTH | Age: 85
End: 2020-07-06
Payer: MEDICARE

## 2020-07-06 ENCOUNTER — HOME CARE VISIT (OUTPATIENT)
Dept: HOME HEALTH SERVICES | Facility: HOME HEALTH | Age: 85
End: 2020-07-06
Payer: MEDICARE

## 2020-07-06 PROCEDURE — G0157 HHC PT ASSISTANT EA 15: HCPCS

## 2020-07-06 PROCEDURE — 3331090002 HH PPS REVENUE DEBIT

## 2020-07-06 PROCEDURE — 3331090001 HH PPS REVENUE CREDIT

## 2020-07-07 ENCOUNTER — HOME CARE VISIT (OUTPATIENT)
Dept: HOME HEALTH SERVICES | Facility: HOME HEALTH | Age: 85
End: 2020-07-07
Payer: MEDICARE

## 2020-07-07 ENCOUNTER — HOME CARE VISIT (OUTPATIENT)
Dept: SCHEDULING | Facility: HOME HEALTH | Age: 85
End: 2020-07-07
Payer: MEDICARE

## 2020-07-07 VITALS
OXYGEN SATURATION: 95 % | DIASTOLIC BLOOD PRESSURE: 78 MMHG | HEART RATE: 74 BPM | TEMPERATURE: 98.4 F | SYSTOLIC BLOOD PRESSURE: 126 MMHG

## 2020-07-07 VITALS
TEMPERATURE: 98.2 F | SYSTOLIC BLOOD PRESSURE: 128 MMHG | RESPIRATION RATE: 18 BRPM | DIASTOLIC BLOOD PRESSURE: 72 MMHG | HEART RATE: 58 BPM | OXYGEN SATURATION: 97 %

## 2020-07-07 PROCEDURE — G0300 HHS/HOSPICE OF LPN EA 15 MIN: HCPCS

## 2020-07-07 PROCEDURE — 3331090002 HH PPS REVENUE DEBIT

## 2020-07-07 PROCEDURE — 3331090001 HH PPS REVENUE CREDIT

## 2020-07-08 PROCEDURE — 3331090002 HH PPS REVENUE DEBIT

## 2020-07-08 PROCEDURE — 3331090001 HH PPS REVENUE CREDIT

## 2020-07-09 ENCOUNTER — HOME CARE VISIT (OUTPATIENT)
Dept: HOME HEALTH SERVICES | Facility: HOME HEALTH | Age: 85
End: 2020-07-09
Payer: MEDICARE

## 2020-07-09 ENCOUNTER — HOME CARE VISIT (OUTPATIENT)
Dept: SCHEDULING | Facility: HOME HEALTH | Age: 85
End: 2020-07-09
Payer: MEDICARE

## 2020-07-09 VITALS
TEMPERATURE: 98.3 F | RESPIRATION RATE: 17 BRPM | OXYGEN SATURATION: 96 % | HEART RATE: 57 BPM | SYSTOLIC BLOOD PRESSURE: 120 MMHG | DIASTOLIC BLOOD PRESSURE: 78 MMHG

## 2020-07-09 VITALS
OXYGEN SATURATION: 98 % | TEMPERATURE: 97.7 F | HEART RATE: 58 BPM | SYSTOLIC BLOOD PRESSURE: 118 MMHG | DIASTOLIC BLOOD PRESSURE: 79 MMHG

## 2020-07-09 PROCEDURE — G0152 HHCP-SERV OF OT,EA 15 MIN: HCPCS

## 2020-07-09 PROCEDURE — 3331090001 HH PPS REVENUE CREDIT

## 2020-07-09 PROCEDURE — G0300 HHS/HOSPICE OF LPN EA 15 MIN: HCPCS

## 2020-07-09 PROCEDURE — 3331090002 HH PPS REVENUE DEBIT

## 2020-07-09 PROCEDURE — G0157 HHC PT ASSISTANT EA 15: HCPCS

## 2020-07-10 ENCOUNTER — HOME CARE VISIT (OUTPATIENT)
Dept: SCHEDULING | Facility: HOME HEALTH | Age: 85
End: 2020-07-10
Payer: MEDICARE

## 2020-07-10 ENCOUNTER — HOME CARE VISIT (OUTPATIENT)
Dept: HOME HEALTH SERVICES | Facility: HOME HEALTH | Age: 85
End: 2020-07-10
Payer: MEDICARE

## 2020-07-10 VITALS
HEART RATE: 60 BPM | OXYGEN SATURATION: 97 % | TEMPERATURE: 97.9 F | DIASTOLIC BLOOD PRESSURE: 58 MMHG | RESPIRATION RATE: 16 BRPM | SYSTOLIC BLOOD PRESSURE: 118 MMHG

## 2020-07-10 VITALS
DIASTOLIC BLOOD PRESSURE: 66 MMHG | HEART RATE: 68 BPM | SYSTOLIC BLOOD PRESSURE: 121 MMHG | TEMPERATURE: 97.8 F | OXYGEN SATURATION: 98 %

## 2020-07-10 PROCEDURE — 3331090001 HH PPS REVENUE CREDIT

## 2020-07-10 PROCEDURE — G0152 HHCP-SERV OF OT,EA 15 MIN: HCPCS

## 2020-07-10 PROCEDURE — 3331090002 HH PPS REVENUE DEBIT

## 2020-07-11 PROCEDURE — 3331090002 HH PPS REVENUE DEBIT

## 2020-07-11 PROCEDURE — 3331090001 HH PPS REVENUE CREDIT

## 2020-07-12 PROCEDURE — 3331090001 HH PPS REVENUE CREDIT

## 2020-07-12 PROCEDURE — 3331090002 HH PPS REVENUE DEBIT

## 2020-07-13 ENCOUNTER — HOME CARE VISIT (OUTPATIENT)
Dept: SCHEDULING | Facility: HOME HEALTH | Age: 85
End: 2020-07-13
Payer: MEDICARE

## 2020-07-13 ENCOUNTER — HOME CARE VISIT (OUTPATIENT)
Dept: HOME HEALTH SERVICES | Facility: HOME HEALTH | Age: 85
End: 2020-07-13
Payer: MEDICARE

## 2020-07-13 VITALS
TEMPERATURE: 98 F | OXYGEN SATURATION: 94 % | SYSTOLIC BLOOD PRESSURE: 155 MMHG | DIASTOLIC BLOOD PRESSURE: 62 MMHG | HEART RATE: 52 BPM

## 2020-07-13 PROCEDURE — 3331090001 HH PPS REVENUE CREDIT

## 2020-07-13 PROCEDURE — G0152 HHCP-SERV OF OT,EA 15 MIN: HCPCS

## 2020-07-13 PROCEDURE — 3331090002 HH PPS REVENUE DEBIT

## 2020-07-14 ENCOUNTER — HOME CARE VISIT (OUTPATIENT)
Dept: HOME HEALTH SERVICES | Facility: HOME HEALTH | Age: 85
End: 2020-07-14
Payer: MEDICARE

## 2020-07-14 PROCEDURE — 3331090001 HH PPS REVENUE CREDIT

## 2020-07-14 PROCEDURE — 3331090002 HH PPS REVENUE DEBIT

## 2020-07-15 ENCOUNTER — HOME CARE VISIT (OUTPATIENT)
Dept: SCHEDULING | Facility: HOME HEALTH | Age: 85
End: 2020-07-15
Payer: MEDICARE

## 2020-07-15 ENCOUNTER — HOME CARE VISIT (OUTPATIENT)
Dept: HOME HEALTH SERVICES | Facility: HOME HEALTH | Age: 85
End: 2020-07-15
Payer: MEDICARE

## 2020-07-15 VITALS
DIASTOLIC BLOOD PRESSURE: 63 MMHG | OXYGEN SATURATION: 94 % | HEART RATE: 82 BPM | SYSTOLIC BLOOD PRESSURE: 112 MMHG | TEMPERATURE: 98.7 F

## 2020-07-15 PROCEDURE — G0152 HHCP-SERV OF OT,EA 15 MIN: HCPCS

## 2020-07-15 PROCEDURE — 3331090002 HH PPS REVENUE DEBIT

## 2020-07-15 PROCEDURE — 3331090001 HH PPS REVENUE CREDIT

## 2020-07-16 ENCOUNTER — OFFICE VISIT (OUTPATIENT)
Dept: ONCOLOGY | Age: 85
End: 2020-07-16

## 2020-07-16 ENCOUNTER — HOME CARE VISIT (OUTPATIENT)
Dept: HOME HEALTH SERVICES | Facility: HOME HEALTH | Age: 85
End: 2020-07-16
Payer: MEDICARE

## 2020-07-16 VITALS
RESPIRATION RATE: 18 BRPM | SYSTOLIC BLOOD PRESSURE: 127 MMHG | DIASTOLIC BLOOD PRESSURE: 58 MMHG | HEIGHT: 66 IN | TEMPERATURE: 98 F | OXYGEN SATURATION: 97 % | BODY MASS INDEX: 21.26 KG/M2 | WEIGHT: 132.28 LBS | HEART RATE: 84 BPM

## 2020-07-16 DIAGNOSIS — C79.51 BONE METASTASIS (HCC): Primary | ICD-10-CM

## 2020-07-16 PROCEDURE — 3331090002 HH PPS REVENUE DEBIT

## 2020-07-16 PROCEDURE — 3331090001 HH PPS REVENUE CREDIT

## 2020-07-16 NOTE — PROGRESS NOTES
Reason for Visit:   Adrianna Burnett is a 80 y.o. male who is seen for new malignancy in bone. Treatment History:   Dx: Left Renal Cell Carcinoma--2009--left radical nephrectomy    Dx: Prostate Cancer--no tissue diagnosis--rising PSA--last 27--6/16/2020. Only symptom is nocturia  Tx: None    Dx: Bone Metastasis--left femur--June 2020--ORIF to pathologic fracture in June 2020  Tx: Tissue Diagnosis  Goal: Palliative      History of Present Illness:   Here for new bone lesion and fracture of femur. Found metastatic lesion in femur. Has been progressing from surgery well. Lives alone beside his son. Moving around without problem, does use rolling walker just in case needs to rest.  Hasn't begun driving or getting out of his house. Loves mowing grass. Up moving around more than half the day. Past Medical History:   Diagnosis Date    BPH (benign prostatic hyperplasia)     Diabetes ([facility] [address]     Hypertension     Left renal mass     Prostate cancer ([facility] [address] 2/22/2019      Past Surgical History:   Procedure Laterality Date    HX CATARACT REMOVAL      bilat    HX CYST REMOVAL      right chest.    HX NEPHRECTOMY Left 2009    Dr Sreekanth Fajardo, left renal CA    HX ORTHOPAEDIC Right 02/2020    Humerus ORIF      Social History     Tobacco Use    Smoking status: Never Smoker    Smokeless tobacco: Never Used   Substance Use Topics    Alcohol use: No      Family History   Problem Relation Age of Onset    Stroke Father      Current Outpatient Medications   Medication Sig    aspirin 81 mg chewable tablet Take 81 mg by mouth daily.  finasteride (Proscar) 5 mg tablet Take 1 Tab by mouth daily.  lisinopril-hydroCHLOROthiazide (PRINZIDE, ZESTORETIC) 20-12.5 mg per tablet Take 1 Tab by mouth daily. Needs follow up and lab    traMADoL (ULTRAM) 50 mg tablet Take 50 mg by mouth every eight (8) hours as needed for Pain. No current facility-administered medications for this visit.        No Known Allergies     Review of Systems: A complete review of systems was obtained, negative except as described above. Physical Exam:     Visit Vitals  /58   Pulse 84   Temp 98 °F (36.7 °C) (Temporal)   Resp 18   Ht 5' 6\" (1.676 m)   Wt 132 lb 4.4 oz (60 kg)   SpO2 97%   BMI 21.35 kg/m²     ECOG PS: 1  General: No distress  Eyes: PERRLA, anicteric sclerae  HENT: Atraumatic, OP clear  Neck: Supple  Lymphatic: No cervical, supraclavicular, or inguinal adenopathy  Respiratory: CTAB, normal respiratory effort  CV: Normal rate, regular rhythm, no murmurs, no peripheral edema  GI: Soft, nontender, nondistended, no masses, no hepatomegaly, no splenomegaly  MS: Normal gait and station. Digits without clubbing or cyanosis. Skin: No rashes, ecchymoses, or petechiae. Normal temperature, turgor, and texture. Psych: Alert, oriented, appropriate affect, normal judgment/insight    Results:     Lab Results   Component Value Date/Time    WBC 9.2 06/18/2020 06:36 PM    HGB 12.0 (L) 06/18/2020 06:36 PM    HCT 37.2 06/18/2020 06:36 PM    PLATELET 272 95/71/3746 06:36 PM    MCV 86.5 06/18/2020 06:36 PM    ABS. NEUTROPHILS 7.4 06/18/2020 06:36 PM     Lab Results   Component Value Date/Time    Sodium 142 06/17/2020 05:40 AM    Potassium 4.8 06/17/2020 05:40 AM    Chloride 108 06/17/2020 05:40 AM    CO2 23 06/17/2020 05:40 AM    Glucose 200 (H) 06/17/2020 05:40 AM    BUN 26 (H) 06/17/2020 05:40 AM    Creatinine 1.57 (H) 06/17/2020 05:40 AM    GFR est AA 50 (L) 06/17/2020 05:40 AM    GFR est non-AA 41 (L) 06/17/2020 05:40 AM    Calcium 8.6 06/17/2020 05:40 AM    Glucose (POC) 121 (H) 06/20/2020 06:00 AM     Lab Results   Component Value Date/Time    Bilirubin, total 1.1 06/11/2020 10:52 AM    ALT (SGPT) 8 06/11/2020 10:52 AM    Alk.  phosphatase 101 06/11/2020 10:52 AM    Protein, total 6.6 06/11/2020 10:52 AM    Albumin 4.3 06/11/2020 10:52 AM    Globulin 2.8 03/24/2009 11:29 AM     Bone Scan 6/17/2020  IMPRESSION: Increased tracer activity corresponding to the lytic lesion in the  left distal femur. Increased tracer activity corresponding to the pathologic  fracture right humerus with prior surgery. CT A/P 6/17/2020  IMPRESSION:  1. Relative enlargement prostate gland. 2. Surgical absence of the left kidney. 3. Normal sized right kidney. No evidence of urolithiasis or hydronephrotic  change. Presence of a small, focal area of relatively decreased attenuation  involving the lower pole of the right kidney. Further studies recommended. Presence of a small cysts within the right kidney. 4. Presence of an abdominal aortic aneurysm as described above. 5. Presence of a moderate amount of gas and fecal material within the colon. Evidence of sigmoid diverticulosis. No evidence of diverticulitis. 6. Normal sized liver. Presence of multiple focal, well-demarcated areas of  low-density area within the liver suggestive of cysts. 7. Presence of bilateral inguinal hernias (containing normal caliber loops of  bowel). Assessment:   1) Bone metastasis  2) Renal Cell Carcinoma  3) Prostate Cancer      Plan:   1) CT guided biopsy through IR at [facility]  2) Cured as 11 years out, unlikely to be recurrence. 3) Could be etiology of #1--I would expect PSA to be higher but not necessarily.      Signed By: Emmett Templeton MD

## 2020-07-16 NOTE — PROGRESS NOTES
Lorne Kehr is a 80 y.o. male  Chief Complaint   Patient presents with    New Patient     1. Have you been to the ER, urgent care clinic since your last visit? Hospitalized since your last visit? No    2. Have you seen or consulted any other health care providers outside of the [address] [place] [address] since your last visit? Include any pap smears or colon screening.  Yes Reason for visit: Ortho Surgery\

## 2020-07-17 PROCEDURE — 3331090002 HH PPS REVENUE DEBIT

## 2020-07-17 PROCEDURE — 3331090001 HH PPS REVENUE CREDIT

## 2020-07-18 PROCEDURE — 3331090001 HH PPS REVENUE CREDIT

## 2020-07-18 PROCEDURE — 3331090002 HH PPS REVENUE DEBIT

## 2020-07-19 PROCEDURE — 3331090001 HH PPS REVENUE CREDIT

## 2020-07-19 PROCEDURE — 3331090002 HH PPS REVENUE DEBIT

## 2020-07-20 ENCOUNTER — HOME CARE VISIT (OUTPATIENT)
Dept: HOME HEALTH SERVICES | Facility: HOME HEALTH | Age: 85
End: 2020-07-20
Payer: MEDICARE

## 2020-07-20 ENCOUNTER — HOME CARE VISIT (OUTPATIENT)
Dept: SCHEDULING | Facility: HOME HEALTH | Age: 85
End: 2020-07-20
Payer: MEDICARE

## 2020-07-20 VITALS
OXYGEN SATURATION: 98 % | HEART RATE: 59 BPM | SYSTOLIC BLOOD PRESSURE: 142 MMHG | TEMPERATURE: 97.8 F | DIASTOLIC BLOOD PRESSURE: 57 MMHG

## 2020-07-20 PROCEDURE — 3331090001 HH PPS REVENUE CREDIT

## 2020-07-20 PROCEDURE — 3331090002 HH PPS REVENUE DEBIT

## 2020-07-20 PROCEDURE — G0152 HHCP-SERV OF OT,EA 15 MIN: HCPCS

## 2020-07-21 ENCOUNTER — HOME CARE VISIT (OUTPATIENT)
Dept: SCHEDULING | Facility: HOME HEALTH | Age: 85
End: 2020-07-21
Payer: MEDICARE

## 2020-07-21 PROCEDURE — 3331090002 HH PPS REVENUE DEBIT

## 2020-07-21 PROCEDURE — 3331090001 HH PPS REVENUE CREDIT

## 2020-07-21 PROCEDURE — G0151 HHCP-SERV OF PT,EA 15 MIN: HCPCS

## 2020-07-22 ENCOUNTER — HOME CARE VISIT (OUTPATIENT)
Dept: HOME HEALTH SERVICES | Facility: HOME HEALTH | Age: 85
End: 2020-07-22
Payer: MEDICARE

## 2020-07-22 ENCOUNTER — PATIENT OUTREACH (OUTPATIENT)
Dept: CASE MANAGEMENT | Age: 85
End: 2020-07-22

## 2020-07-22 PROCEDURE — 3331090002 HH PPS REVENUE DEBIT

## 2020-07-22 PROCEDURE — 3331090001 HH PPS REVENUE CREDIT

## 2020-07-22 NOTE — PROGRESS NOTES
Patient contacted regarding recent discharge and COVID-19 risk. Discussed COVID-19 related testing which was not done at this time. Test results were not done. Patient informed of results, if available? n/a    Care Transition Nurse/ Ambulatory Care Manager contacted the family by telephone to perform post discharge assessment. Verified name and  with family as identifiers. Patient has following risk factors of: immunocompromised, diabetes and HTN, advanced age. Advance Care Planning:   Does patient have an Advance Directive: Primary decision maker on file     PHI reports pt.doing well, is scheduled for testing at [facility], will have COVID-19 test to prepare for upcoming procedure. Pt.will f/u with PCP, Dr. Tereza Child as needed. Education provided regarding infection prevention, and signs and symptoms of COVID-19 and when to seek medical attention with family who verbalized understanding. The family agrees to contact the PCP office for questions related to their healthcare. CTN/ACM provided contact information for future reference. From CDC: Are you at higher risk for severe illness?  Wash your hands often.  Avoid close contact (6 feet, which is about two arm lengths) with people who are sick.  Put distance between yourself and other people if COVID-19 is spreading in your community.  Clean and disinfect frequently touched surfaces.  Avoid all cruise travel and non-essential air travel.  Call your healthcare professional if you have concerns about COVID-19 and your underlying condition or if you are sick. For more information on steps you can take to protect yourself, see CDC's How to Protect Yourself      Patient/family/caregiver given information for Francy Mueller and agrees to enroll no  Plan for follow-up call in 7-14 days based on severity of symptoms and risk factors.

## 2020-07-23 ENCOUNTER — HOSPITAL ENCOUNTER (OUTPATIENT)
Dept: PREADMISSION TESTING | Age: 85
Discharge: HOME | End: 2020-07-23
Payer: MEDICARE

## 2020-07-23 PROCEDURE — 3331090002 HH PPS REVENUE DEBIT

## 2020-07-23 PROCEDURE — 87635 SARS-COV-2 COVID-19 AMP PRB: CPT

## 2020-07-23 PROCEDURE — 3331090001 HH PPS REVENUE CREDIT

## 2020-07-24 ENCOUNTER — HOME CARE VISIT (OUTPATIENT)
Dept: SCHEDULING | Facility: HOME HEALTH | Age: 85
End: 2020-07-24
Payer: MEDICARE

## 2020-07-24 ENCOUNTER — HOME CARE VISIT (OUTPATIENT)
Dept: HOME HEALTH SERVICES | Facility: HOME HEALTH | Age: 85
End: 2020-07-24
Payer: MEDICARE

## 2020-07-24 VITALS
DIASTOLIC BLOOD PRESSURE: 71 MMHG | HEART RATE: 47 BPM | TEMPERATURE: 98.1 F | OXYGEN SATURATION: 97 % | SYSTOLIC BLOOD PRESSURE: 124 MMHG

## 2020-07-24 PROCEDURE — 400013 HH SOC

## 2020-07-24 PROCEDURE — 3331090001 HH PPS REVENUE CREDIT

## 2020-07-24 PROCEDURE — 3331090002 HH PPS REVENUE DEBIT

## 2020-07-24 PROCEDURE — G0151 HHCP-SERV OF PT,EA 15 MIN: HCPCS

## 2020-07-24 PROCEDURE — G0152 HHCP-SERV OF OT,EA 15 MIN: HCPCS

## 2020-07-24 NOTE — PROGRESS NOTES
Have you been tested for COVID-19 in the past 7 days? NO    Do you have a personal history of COVID-19 within the past 28 days? NO  If Yes, What was the method of testing: clinical assumption or test result? N/a    Have you had close contact with a known to be positive COVID-19 patient within the past 14 days? NO    Are you a healthcare worker or ? NO  If Yes, have you been exposed to COVID-19 without proper PPE? n/a    Do you live in a SNF, adult home or other institutional setting?  NO  If Yes, have they experienced a flood of COVID-19 positive patients?  n/a    In the past 2-14 days have you had any of the following symptoms   NONE   Cough   New onset Shortness of breath or difficulty breathing    Or at least two of these symptoms:  NONE   Fever greater than 100 F   Chills   Repeated shaking with chills   Muscle pain   Headache   Sore throat   New loss of taste or smell   New onset diarrhea

## 2020-07-25 PROCEDURE — 3331090001 HH PPS REVENUE CREDIT

## 2020-07-25 PROCEDURE — 3331090002 HH PPS REVENUE DEBIT

## 2020-07-26 LAB
SARS-COV-2, COV2NT: NOT DETECTED
SOURCE, COVRS: NORMAL
SPECIMEN SOURCE, FCOV2M: NORMAL

## 2020-07-26 PROCEDURE — 3331090002 HH PPS REVENUE DEBIT

## 2020-07-26 PROCEDURE — 3331090001 HH PPS REVENUE CREDIT

## 2020-07-27 ENCOUNTER — HOSPITAL ENCOUNTER (OUTPATIENT)
Dept: CT IMAGING | Age: 85
Discharge: HOME | End: 2020-07-27
Attending: INTERNAL MEDICINE
Payer: MEDICARE

## 2020-07-27 VITALS
WEIGHT: 132 LBS | DIASTOLIC BLOOD PRESSURE: 57 MMHG | SYSTOLIC BLOOD PRESSURE: 108 MMHG | OXYGEN SATURATION: 97 % | HEART RATE: 84 BPM | RESPIRATION RATE: 20 BRPM | TEMPERATURE: 98.1 F | BODY MASS INDEX: 20.67 KG/M2

## 2020-07-27 DIAGNOSIS — C79.51 BONE METASTASIS (HCC): ICD-10-CM

## 2020-07-27 PROCEDURE — 77030003375 HC MRK BIOP BEEK -A

## 2020-07-27 PROCEDURE — 77030040395 HC NDL BIOP COAX INTRO MRTM -B

## 2020-07-27 PROCEDURE — 77012 CT SCAN FOR NEEDLE BIOPSY: CPT

## 2020-07-27 PROCEDURE — 88341 IMHCHEM/IMCYTCHM EA ADD ANTB: CPT

## 2020-07-27 PROCEDURE — 88333 PATH CONSLTJ SURG CYTO XM 1: CPT

## 2020-07-27 PROCEDURE — 3331090002 HH PPS REVENUE DEBIT

## 2020-07-27 PROCEDURE — 77030014115

## 2020-07-27 PROCEDURE — 74011250636 HC RX REV CODE- 250/636: Performed by: STUDENT IN AN ORGANIZED HEALTH CARE EDUCATION/TRAINING PROGRAM

## 2020-07-27 PROCEDURE — 3331090001 HH PPS REVENUE CREDIT

## 2020-07-27 PROCEDURE — 88307 TISSUE EXAM BY PATHOLOGIST: CPT

## 2020-07-27 PROCEDURE — 20225 BONE BIOPSY TROCAR/NDL DEEP: CPT

## 2020-07-27 PROCEDURE — 88342 IMHCHEM/IMCYTCHM 1ST ANTB: CPT

## 2020-07-27 RX ORDER — FENTANYL CITRATE 50 UG/ML
100 INJECTION, SOLUTION INTRAMUSCULAR; INTRAVENOUS
Status: DISCONTINUED | OUTPATIENT
Start: 2020-07-27 | End: 2020-07-27

## 2020-07-27 RX ORDER — SODIUM CHLORIDE 9 MG/ML
25 INJECTION, SOLUTION INTRAVENOUS CONTINUOUS
Status: DISCONTINUED | OUTPATIENT
Start: 2020-07-27 | End: 2020-07-27

## 2020-07-27 RX ORDER — MIDAZOLAM HYDROCHLORIDE 1 MG/ML
INJECTION INTRAMUSCULAR; INTRAVENOUS
Status: DISCONTINUED
Start: 2020-07-27 | End: 2020-07-31 | Stop reason: HOSPADM

## 2020-07-27 RX ORDER — MIDAZOLAM HYDROCHLORIDE 1 MG/ML
5 INJECTION INTRAMUSCULAR; INTRAVENOUS
Status: DISCONTINUED | OUTPATIENT
Start: 2020-07-27 | End: 2020-07-27

## 2020-07-27 RX ADMIN — FENTANYL CITRATE 25 MCG: 50 INJECTION, SOLUTION INTRAMUSCULAR; INTRAVENOUS at 11:17

## 2020-07-27 RX ADMIN — MIDAZOLAM HYDROCHLORIDE 1 MG: 1 INJECTION, SOLUTION INTRAMUSCULAR; INTRAVENOUS at 11:17

## 2020-07-27 RX ADMIN — SODIUM CHLORIDE 25 ML/HR: 900 INJECTION, SOLUTION INTRAVENOUS at 10:50

## 2020-07-27 RX ADMIN — MIDAZOLAM HYDROCHLORIDE 1 MG: 1 INJECTION, SOLUTION INTRAMUSCULAR; INTRAVENOUS at 11:27

## 2020-07-27 NOTE — ROUTINE PROCESS
1033 -  Pt ambulated into department w/upright steady gait, w/o assistance, A/O x4    Dr. Jose David Griffiths Present for pre procedure consult and consent - questions reviewed with understanding - consent signed. Discharge instructions reviewed, will review again at discharge. 1117 - in room, time out - CT guided bone biopsy w/conscious sedation              Start time 1117               Sedation start : 1117                          Versed 2 mg IV                          Fentanyl 25 mcg IV              Sedation end : 1137  - pt tolerated procedure well    Adam Miller from cytology present to verify sample and handle specimen. Reviewed scanned in EKG to verify heart rate similar.

## 2020-07-27 NOTE — DISCHARGE INSTRUCTIONS
[facility]  Radiology Department  [phone]       Bone Marrow Biopsy Discharge Instructions      Go home and rest  and restrict your activity the next 24 hours. You have been given sedating medications, so do not drive or make important decisions today. Resume your previous diet and prescribed medications. You may shower in 24 hours. Do not soak or swim until the biopsy site has healed completely to minimize any risk of infection. Watch site for redness, drainage, pus, foul odor, increasing pain and fevers. Should this occur call you doctor immediatly. You may take Tylenol if allowed, as directed on the label, for pain or discomfort. Avoid Ibuprofen (Advil, Motrin etc.) and Aspirin today as they may increase your risk of bleeding. Be sure to follow up with your physician, and let him know how you are progressing and to receive your results. If you have any questions about your procedure today please call and ask to speak to a radiology nurse.        I

## 2020-07-27 NOTE — H&P
Radiology History and Physical    Patient: Nicola Pryortist 80 y.o. male       Chief Complaint: No chief complaint on file. History of Present Illness: Left femur lesion bx    History:    Past Medical History:   Diagnosis Date    BPH (benign prostatic hyperplasia)     Diabetes ([facility]     Hypertension     Left renal mass     Prostate cancer ([facility] 2/22/2019     Family History   Problem Relation Age of Onset    Stroke Father      Social History     Socioeconomic History    Marital status:      Spouse name: Not on file    Number of children: Not on file    Years of education: Not on file    Highest education level: Not on file   Occupational History    Not on file   Social Needs    Financial resource strain: Not on file    Food insecurity     Worry: Not on file     Inability: Not on file    Transportation needs     Medical: Not on file     Non-medical: Not on file   Tobacco Use    Smoking status: Never Smoker    Smokeless tobacco: Never Used   Substance and Sexual Activity    Alcohol use: No    Drug use: No    Sexual activity: Not on file   Lifestyle    Physical activity     Days per week: Not on file     Minutes per session: Not on file    Stress: Not on file   Relationships    Social connections     Talks on phone: Not on file     Gets together: Not on file     Attends [employer] service: Not on file     Active member of club or organization: Not on file     Attends meetings of clubs or organizations: Not on file     Relationship status: Not on file    Intimate partner violence     Fear of current or ex partner: Not on file     Emotionally abused: Not on file     Physically abused: Not on file     Forced sexual activity: Not on file   Other Topics Concern    Not on file   Social History Narrative    Not on file       Allergies: No Known Allergies    Current Medications:  Current Outpatient Medications   Medication Sig    aspirin 81 mg chewable tablet Take 81 mg by mouth daily.     traMADoL (ULTRAM) 50 mg tablet Take 50 mg by mouth every eight (8) hours as needed for Pain.  finasteride (Proscar) 5 mg tablet Take 1 Tab by mouth daily.  lisinopril-hydroCHLOROthiazide (PRINZIDE, ZESTORETIC) 20-12.5 mg per tablet Take 1 Tab by mouth daily. Needs follow up and lab     No current facility-administered medications for this encounter. Physical Exam:  There were no vitals taken for this visit. GENERAL: alert, cooperative, no distress, appears stated age  LUNG: clear to auscultation bilaterally  HEART: regular rate and rhythm  ABD: Non tender, non distended. Alerts:    Hospital Problems  Date Reviewed: 7/16/2020    None          Laboratory:    No results for input(s): HGB, HCT, WBC, PLT, INR, BUN, CREA, K, CRCLT, HGBEXT, HCTEXT, PLTEXT, INREXT in the last 72 hours. No lab exists for component: PTT, PT      Plan of Care/Planned Procedure:  Risks, benefits, and alternatives reviewed with patient and he agrees to proceed with the procedure. Deemed appropriate or moderate sedation with versed and fentanyl.       Vicki Gurrola MD

## 2020-07-27 NOTE — PROGRESS NOTES
1200 - Pt sitting at bedside a/o x4 - he is feeding himself PNB crackers and drinking ginger ale - pt denies pain or discomfort - left thigh dressing remains dry/intact. 1215 - pt completed eating and dressed self - saline lock removed w/tip in tact - pt ambulated in dept w/upright steady gait - wheelchair used to take pt to entrance of [facility] where son was driving pt home. Pt remains pain free.

## 2020-07-28 ENCOUNTER — HOME CARE VISIT (OUTPATIENT)
Dept: SCHEDULING | Facility: HOME HEALTH | Age: 85
End: 2020-07-28
Payer: MEDICARE

## 2020-07-28 PROCEDURE — 3331090001 HH PPS REVENUE CREDIT

## 2020-07-28 PROCEDURE — G0151 HHCP-SERV OF PT,EA 15 MIN: HCPCS

## 2020-07-28 PROCEDURE — 3331090002 HH PPS REVENUE DEBIT

## 2020-07-29 ENCOUNTER — HOME CARE VISIT (OUTPATIENT)
Dept: SCHEDULING | Facility: HOME HEALTH | Age: 85
End: 2020-07-29
Payer: MEDICARE

## 2020-07-29 ENCOUNTER — HOME CARE VISIT (OUTPATIENT)
Dept: HOME HEALTH SERVICES | Facility: HOME HEALTH | Age: 85
End: 2020-07-29
Payer: MEDICARE

## 2020-07-29 VITALS — HEART RATE: 48 BPM | OXYGEN SATURATION: 96 % | TEMPERATURE: 97.7 F

## 2020-07-29 PROCEDURE — 3331090002 HH PPS REVENUE DEBIT

## 2020-07-29 PROCEDURE — G0152 HHCP-SERV OF OT,EA 15 MIN: HCPCS

## 2020-07-29 PROCEDURE — 3331090001 HH PPS REVENUE CREDIT

## 2020-07-30 PROCEDURE — 3331090002 HH PPS REVENUE DEBIT

## 2020-07-30 PROCEDURE — 3331090001 HH PPS REVENUE CREDIT

## 2020-07-31 ENCOUNTER — HOME CARE VISIT (OUTPATIENT)
Dept: SCHEDULING | Facility: HOME HEALTH | Age: 85
End: 2020-07-31
Payer: MEDICARE

## 2020-07-31 ENCOUNTER — HOME CARE VISIT (OUTPATIENT)
Dept: HOME HEALTH SERVICES | Facility: HOME HEALTH | Age: 85
End: 2020-07-31
Payer: MEDICARE

## 2020-07-31 VITALS
HEART RATE: 61 BPM | OXYGEN SATURATION: 96 % | SYSTOLIC BLOOD PRESSURE: 120 MMHG | DIASTOLIC BLOOD PRESSURE: 64 MMHG | TEMPERATURE: 97.7 F

## 2020-07-31 PROCEDURE — G0151 HHCP-SERV OF PT,EA 15 MIN: HCPCS

## 2020-07-31 PROCEDURE — 3331090001 HH PPS REVENUE CREDIT

## 2020-07-31 PROCEDURE — 3331090002 HH PPS REVENUE DEBIT

## 2020-07-31 PROCEDURE — G0152 HHCP-SERV OF OT,EA 15 MIN: HCPCS

## 2020-08-01 PROCEDURE — 3331090001 HH PPS REVENUE CREDIT

## 2020-08-01 PROCEDURE — 3331090002 HH PPS REVENUE DEBIT

## 2020-08-02 PROCEDURE — 3331090002 HH PPS REVENUE DEBIT

## 2020-08-02 PROCEDURE — 3331090001 HH PPS REVENUE CREDIT

## 2020-08-03 ENCOUNTER — HOME CARE VISIT (OUTPATIENT)
Dept: SCHEDULING | Facility: HOME HEALTH | Age: 85
End: 2020-08-03
Payer: MEDICARE

## 2020-08-03 ENCOUNTER — HOME CARE VISIT (OUTPATIENT)
Dept: HOME HEALTH SERVICES | Facility: HOME HEALTH | Age: 85
End: 2020-08-03
Payer: MEDICARE

## 2020-08-03 VITALS
HEART RATE: 51 BPM | DIASTOLIC BLOOD PRESSURE: 74 MMHG | SYSTOLIC BLOOD PRESSURE: 141 MMHG | TEMPERATURE: 98 F | OXYGEN SATURATION: 95 %

## 2020-08-03 PROCEDURE — 3331090001 HH PPS REVENUE CREDIT

## 2020-08-03 PROCEDURE — 3331090002 HH PPS REVENUE DEBIT

## 2020-08-03 PROCEDURE — G0152 HHCP-SERV OF OT,EA 15 MIN: HCPCS

## 2020-08-04 ENCOUNTER — HOME CARE VISIT (OUTPATIENT)
Dept: HOME HEALTH SERVICES | Facility: HOME HEALTH | Age: 85
End: 2020-08-04
Payer: MEDICARE

## 2020-08-04 PROCEDURE — 3331090002 HH PPS REVENUE DEBIT

## 2020-08-04 PROCEDURE — 3331090001 HH PPS REVENUE CREDIT

## 2020-08-05 ENCOUNTER — HOME CARE VISIT (OUTPATIENT)
Dept: SCHEDULING | Facility: HOME HEALTH | Age: 85
End: 2020-08-05
Payer: MEDICARE

## 2020-08-05 ENCOUNTER — HOME CARE VISIT (OUTPATIENT)
Dept: HOME HEALTH SERVICES | Facility: HOME HEALTH | Age: 85
End: 2020-08-05
Payer: MEDICARE

## 2020-08-05 VITALS
SYSTOLIC BLOOD PRESSURE: 130 MMHG | HEART RATE: 52 BPM | DIASTOLIC BLOOD PRESSURE: 73 MMHG | OXYGEN SATURATION: 93 % | TEMPERATURE: 97.8 F

## 2020-08-05 PROCEDURE — 3331090001 HH PPS REVENUE CREDIT

## 2020-08-05 PROCEDURE — G0152 HHCP-SERV OF OT,EA 15 MIN: HCPCS

## 2020-08-05 PROCEDURE — 3331090002 HH PPS REVENUE DEBIT

## 2020-08-05 PROCEDURE — G0157 HHC PT ASSISTANT EA 15: HCPCS

## 2020-08-06 ENCOUNTER — OFFICE VISIT (OUTPATIENT)
Dept: ONCOLOGY | Age: 85
End: 2020-08-06

## 2020-08-06 VITALS
HEIGHT: 67 IN | SYSTOLIC BLOOD PRESSURE: 131 MMHG | DIASTOLIC BLOOD PRESSURE: 54 MMHG | WEIGHT: 133.6 LBS | RESPIRATION RATE: 16 BRPM | HEART RATE: 43 BPM | TEMPERATURE: 97.6 F | OXYGEN SATURATION: 95 % | BODY MASS INDEX: 20.97 KG/M2

## 2020-08-06 DIAGNOSIS — C64.1 MALIGNANT NEOPLASM OF RIGHT KIDNEY, EXCEPT RENAL PELVIS (HCC): ICD-10-CM

## 2020-08-06 DIAGNOSIS — C64.9 RENAL CELL CARCINOMA, UNSPECIFIED LATERALITY (HCC): Primary | ICD-10-CM

## 2020-08-06 PROCEDURE — 3331090001 HH PPS REVENUE CREDIT

## 2020-08-06 PROCEDURE — 3331090002 HH PPS REVENUE DEBIT

## 2020-08-06 NOTE — PROGRESS NOTES
Trace Brooks is a 80 y.o. male who is seen for new malignancy in bone. Patient denies complaints. He has gained a pound over the last month. HR 46 today, per patient this is normal for him.

## 2020-08-06 NOTE — PROGRESS NOTES
Reason for Visit:   Micaela Frederick is a 80 y.o. male who is seen for new malignancy in bone.      Treatment History:   Dx: Left Renal Cell Carcinoma--2009--left radical nephrectomy     Dx: Prostate Cancer--no tissue diagnosis--rising PSA--last 27--6/16/2020. Only symptom is nocturia  Tx: None     Dx: Recurrent Renal Cell Carcinoma--Bone Metastasis--left femur--June 2020--ORIF to pathologic fracture in June 2020  Tx: Radiation to femur v/s targeted therapy  Goal: Palliative     History of Present Illness:   Doing well, active, gaining wt, no new symptoms    Past Medical History:   Diagnosis Date    BPH (benign prostatic hyperplasia)     Diabetes ([facility] [address]     Hypertension     Left renal mass     Prostate cancer ([facility] [address] 2/22/2019      Past Surgical History:   Procedure Laterality Date    HX CATARACT REMOVAL      bilat    HX CYST REMOVAL      right chest.    HX NEPHRECTOMY Left 2009    Dr Emi Uriarte, left renal CA    HX ORTHOPAEDIC Right 02/2020    Humerus ORIF      Social History     Tobacco Use    Smoking status: Never Smoker    Smokeless tobacco: Never Used   Substance Use Topics    Alcohol use: No      Family History   Problem Relation Age of Onset    Stroke Father      Current Outpatient Medications   Medication Sig    aspirin 81 mg chewable tablet Take 81 mg by mouth daily.  finasteride (Proscar) 5 mg tablet Take 1 Tab by mouth daily.  lisinopril-hydroCHLOROthiazide (PRINZIDE, ZESTORETIC) 20-12.5 mg per tablet Take 1 Tab by mouth daily. Needs follow up and lab    traMADoL (ULTRAM) 50 mg tablet Take 50 mg by mouth every eight (8) hours as needed for Pain. No current facility-administered medications for this visit. No Known Allergies     Review of Systems: A complete review of systems was obtained, negative except as described above.     Physical Exam:     Visit Vitals  /54   Pulse (!) 43   Temp 97.6 °F (36.4 °C) (Skin)   Resp 16   Ht 5' 7\" (1.702 m)   Wt 133 lb 9.6 oz (60.6 kg)   SpO2 95%   BMI 20.92 kg/m²     ECOG PS: 0  General: No distress  Eyes: PERRLA, anicteric sclerae  HENT: Atraumatic, OP clear  Neck: Supple  Lymphatic: No cervical, supraclavicular, or inguinal adenopathy  Respiratory: CTAB, normal respiratory effort  CV: Normal rate, regular rhythm, no murmurs, no peripheral edema  GI: Soft, nontender, nondistended, no masses, no hepatomegaly, no splenomegaly  MS: Normal gait and station. Digits without clubbing or cyanosis. Skin: No rashes, ecchymoses, or petechiae. Normal temperature, turgor, and texture. Psych: Alert, oriented, appropriate affect, normal judgment/insight    Results:     Lab Results   Component Value Date/Time    WBC 9.1 07/21/2020 06:12 PM    HGB 12.8 07/21/2020 06:12 PM    HCT 39.2 07/21/2020 06:12 PM    PLATELET 593 17/60/6877 06:12 PM    MCV 84.3 07/21/2020 06:12 PM    ABS. NEUTROPHILS 6.3 07/21/2020 06:12 PM     Lab Results   Component Value Date/Time    Sodium 141 07/21/2020 06:12 PM    Potassium 4.4 07/21/2020 06:12 PM    Chloride 103 07/21/2020 06:12 PM    CO2 26 07/21/2020 06:12 PM    Glucose 111 (H) 07/21/2020 06:12 PM    BUN 29 (H) 07/21/2020 06:12 PM    Creatinine 1.54 (H) 07/21/2020 06:12 PM    GFR est AA 51 (L) 07/21/2020 06:12 PM    GFR est non-AA 42 (L) 07/21/2020 06:12 PM    Calcium 9.6 07/21/2020 06:12 PM    Glucose (POC) 121 (H) 06/20/2020 06:00 AM     Lab Results   Component Value Date/Time    Bilirubin, total 1.0 07/21/2020 06:12 PM    ALT (SGPT) 16 07/21/2020 06:12 PM    Alk. phosphatase 107 07/21/2020 06:12 PM    Protein, total 7.2 07/21/2020 06:12 PM    Albumin 3.8 07/21/2020 06:12 PM    Globulin 3.4 07/21/2020 06:12 PM     Bone Scan 6/17/2020  IMPRESSION: Increased tracer activity corresponding to the lytic lesion in the  left distal femur. Increased tracer activity corresponding to the pathologic  fracture right humerus with prior surgery.     CT A/P 6/17/2020  IMPRESSION:  1. Relative enlargement prostate gland.   2. Surgical absence of the left kidney. 3. Normal sized right kidney. No evidence of urolithiasis or hydronephrotic  change. Presence of a small, focal area of relatively decreased attenuation  involving the lower pole of the right kidney. Further studies recommended. Presence of a small cysts within the right kidney. 4. Presence of an abdominal aortic aneurysm as described above. 5. Presence of a moderate amount of gas and fecal material within the colon. Evidence of sigmoid diverticulosis. No evidence of diverticulitis. 6. Normal sized liver. Presence of multiple focal, well-demarcated areas of  low-density area within the liver suggestive of cysts. 7. Presence of bilateral inguinal hernias (containing normal caliber loops of  bowel).    Biopsy Left Femur 7/27/2020  CYTOLOGIC INTERPRETATION:   Bone, Left Femur, lesion, CT-guided core biopsy with touch preparation:   Metastatic clear cell renal cell carcinoma   See comment   General Categorization     Assessment:   1) Bone metastasis  2) Renal Cell Carcinoma  3) Prostate Cancer        Plan:   1) CT guided biopsy showing metastatic RCC--no evidence of disease in A/P, will get CT chest w/o contrast (poor renal function). If no disease in chest will refer to Radiation Oncology for the femur mass. Hold Targeted Therapy until has more systemic disease burden. 2) Appears to have relapsed to bone.    3) Watching for now, slowly progressing PSA.          Signed By: Ana Rosa Torre MD

## 2020-08-07 ENCOUNTER — HOME CARE VISIT (OUTPATIENT)
Dept: SCHEDULING | Facility: HOME HEALTH | Age: 85
End: 2020-08-07
Payer: MEDICARE

## 2020-08-07 PROCEDURE — 3331090002 HH PPS REVENUE DEBIT

## 2020-08-07 PROCEDURE — 3331090001 HH PPS REVENUE CREDIT

## 2020-08-07 PROCEDURE — G0151 HHCP-SERV OF PT,EA 15 MIN: HCPCS

## 2020-08-08 PROCEDURE — 3331090002 HH PPS REVENUE DEBIT

## 2020-08-08 PROCEDURE — 3331090001 HH PPS REVENUE CREDIT

## 2020-08-09 PROCEDURE — 3331090002 HH PPS REVENUE DEBIT

## 2020-08-09 PROCEDURE — 3331090001 HH PPS REVENUE CREDIT

## 2020-09-01 NOTE — PROGRESS NOTES
Liane Rosenberg a 89 y. o. male who is seen for new malignancy in bone. Patient denies complaints.     Last;  CT BX 7/27/2020  CT Chest 8/13/2020

## 2020-09-02 ENCOUNTER — OFFICE VISIT (OUTPATIENT)
Dept: ONCOLOGY | Age: 85
End: 2020-09-02

## 2020-09-02 VITALS
OXYGEN SATURATION: 93 % | BODY MASS INDEX: 21.38 KG/M2 | RESPIRATION RATE: 16 BRPM | TEMPERATURE: 97.8 F | HEIGHT: 67 IN | SYSTOLIC BLOOD PRESSURE: 145 MMHG | HEART RATE: 45 BPM | WEIGHT: 136.2 LBS | DIASTOLIC BLOOD PRESSURE: 51 MMHG

## 2020-09-02 DIAGNOSIS — C64.2 RENAL CELL CARCINOMA OF LEFT KIDNEY (HCC): Primary | ICD-10-CM

## 2020-09-02 NOTE — PROGRESS NOTES
Reason for Visit:   Adelina Garcia a 89 y. o. male who is seen for new malignancy in bone.      Treatment History:   Dx: Left Renal Cell Carcinoma--2009--left radical nephrectomy     Dx: Prostate Cancer--no tissue diagnosis--rising PSA--last 27--6/16/2020.  Only symptom is nocturia  Tx: None     Dx: Recurrent Renal Cell Carcinoma--Bone Metastasis--left femur--June 2020--ORIF to pathologic fracture in June 2020. Lung Mets from CT Chest August 2020. Tx: Radiation to femur followed by targeted therapy with pazopanib 400mg daily--see if tolerates  Goal: Palliative     History of Present Illness:   Ambulating is better, no symptoms otherwise. No dyspnea, no cp/palp. No n/v/d/constipation, no wt loss. Past Medical History:   Diagnosis Date    BPH (benign prostatic hyperplasia)     Diabetes ([facility] [address]     Hypertension     Left renal mass     Prostate cancer ([facility] [address] 2/22/2019      Past Surgical History:   Procedure Laterality Date    HX CATARACT REMOVAL      bilat    HX CYST REMOVAL      right chest.    HX NEPHRECTOMY Left 2009    Dr Nevaeh Klein, left renal CA    HX ORTHOPAEDIC Right 02/2020    Humerus ORIF      Social History     Tobacco Use    Smoking status: Never Smoker    Smokeless tobacco: Never Used   Substance Use Topics    Alcohol use: No      Family History   Problem Relation Age of Onset    Stroke Father      Current Outpatient Medications   Medication Sig    aspirin 81 mg chewable tablet Take 81 mg by mouth daily.  traMADoL (ULTRAM) 50 mg tablet Take 50 mg by mouth every eight (8) hours as needed for Pain.  finasteride (Proscar) 5 mg tablet Take 1 Tab by mouth daily.  lisinopril-hydroCHLOROthiazide (PRINZIDE, ZESTORETIC) 20-12.5 mg per tablet Take 1 Tab by mouth daily. Needs follow up and lab     No current facility-administered medications for this visit.        No Known Allergies     Review of Systems: A complete review of systems was obtained, negative except as described above. Physical Exam:     Visit Vitals  /51   Pulse (!) 45   Temp 97.8 °F (36.6 °C) (Skin)   Resp 16   Ht 5' 7\" (1.702 m)   Wt 136 lb 3.2 oz (61.8 kg)   SpO2 93%   BMI 21.33 kg/m²     ECOG PS: 1  General: No distress  Eyes: PERRLA, anicteric sclerae  HENT: Atraumatic, OP clear  Neck: Supple  Lymphatic: No cervical, supraclavicular, or inguinal adenopathy  Respiratory: CTAB, normal respiratory effort  CV: Normal rate, regular rhythm, no murmurs, no peripheral edema  GI: Soft, nontender, nondistended, no masses, no hepatomegaly, no splenomegaly  MS: Normal gait and station. Digits without clubbing or cyanosis. Skin: No rashes, ecchymoses, or petechiae. Normal temperature, turgor, and texture. Psych: Alert, oriented, appropriate affect, normal judgment/insight    Results:     Lab Results   Component Value Date/Time    WBC 9.1 07/21/2020 06:12 PM    HGB 12.8 07/21/2020 06:12 PM    HCT 39.2 07/21/2020 06:12 PM    PLATELET 875 24/77/9638 06:12 PM    MCV 84.3 07/21/2020 06:12 PM    ABS. NEUTROPHILS 6.3 07/21/2020 06:12 PM     Lab Results   Component Value Date/Time    Sodium 141 07/21/2020 06:12 PM    Potassium 4.4 07/21/2020 06:12 PM    Chloride 103 07/21/2020 06:12 PM    CO2 26 07/21/2020 06:12 PM    Glucose 111 (H) 07/21/2020 06:12 PM    BUN 29 (H) 07/21/2020 06:12 PM    Creatinine 1.54 (H) 07/21/2020 06:12 PM    GFR est AA 51 (L) 07/21/2020 06:12 PM    GFR est non-AA 42 (L) 07/21/2020 06:12 PM    Calcium 9.6 07/21/2020 06:12 PM    Glucose (POC) 121 (H) 06/20/2020 06:00 AM     Lab Results   Component Value Date/Time    Bilirubin, total 1.0 07/21/2020 06:12 PM    ALT (SGPT) 16 07/21/2020 06:12 PM    Alk. phosphatase 107 07/21/2020 06:12 PM    Protein, total 7.2 07/21/2020 06:12 PM    Albumin 3.8 07/21/2020 06:12 PM    Globulin 3.4 07/21/2020 06:12 PM       Bone Scan 6/17/2020  IMPRESSION: Increased tracer activity corresponding to the lytic lesion in the  left distal femur.  Increased tracer activity corresponding to the pathologic  fracture right humerus with prior surgery.     CT A/P 6/17/2020  IMPRESSION:  1. Relative enlargement prostate gland. 2. Surgical absence of the left kidney. 3. Normal sized right kidney. No evidence of urolithiasis or hydronephrotic  change. Presence of a small, focal area of relatively decreased attenuation  involving the lower pole of the right kidney. Further studies recommended. Presence of a small cysts within the right kidney. 4. Presence of an abdominal aortic aneurysm as described above. 5. Presence of a moderate amount of gas and fecal material within the colon. Evidence of sigmoid diverticulosis. No evidence of diverticulitis. 6. Normal sized liver. Presence of multiple focal, well-demarcated areas of  low-density area within the liver suggestive of cysts. 7. Presence of bilateral inguinal hernias (containing normal caliber loops of  Bowel). CT Chest 8/13/2020  IMPRESSION:  1. Presence of three nodular lesions within the right lung as described above. 2. No definite evidence of mediastinal or hilar lymphadenopathy. 3. Normal appearance of the adrenal glands. 4. Presence of low-density areas within the liver possibly representing cysts. 5. Surgical absence of the left kidney. Normal appearance of the visualized  portions of the remaining right kidney. 6. Presence of moderate coronary artery calcification.   7. Presence of a focal, well-demarcated soft tissue density within the  subcutaneous tissues of the lateral aspect of the right side of the chest  (unchanged in appearance).     Biopsy Left Femur 7/27/2020  CYTOLOGIC INTERPRETATION:   Bone, Left Femur, lesion, CT-guided core biopsy with touch preparation:   Metastatic clear cell renal cell carcinoma   See comment   General Categorization      Assessment:   1) Bone metastasis  2) Renal Cell Carcinoma--lung nodules consistent with mets  3) Prostate Cancer  4) Chest wall nodule     Plan:   1) CT guided biopsy showing metastatic RCC--send to Rad Onc for Palliative Radiation. 2) Small nodules but most likely mets, start low dose pazopanib once radiation is complete. Follow LFT's and TFT's.    3) Watching for now, slowly progressing PSA.    4) Could be met--would need biopsy to prove--could be cyst vs lipoma.         Signed By: Jalil Anguiano MD

## 2020-09-08 ENCOUNTER — DOCUMENTATION ONLY (OUTPATIENT)
Dept: ONCOLOGY | Age: 85
End: 2020-09-08

## 2020-09-08 NOTE — PROGRESS NOTES
Chemo care information on Pazpanib reviewed with Patient and his son. Side effects/symptom management reviewed. Provided and reviewed chemo packet, and after hours contact information. Questions answered. Consent for palliative treatment obtained at this time.

## 2020-09-16 ENCOUNTER — TELEPHONE (OUTPATIENT)
Dept: ONCOLOGY | Age: 85
End: 2020-09-16

## 2020-09-16 NOTE — TELEPHONE ENCOUNTER
Received a VM message from Trice Sams.. They have not been able to reach patient. His co pay would be $ 2160.95 for the Votrient. Need to contact  ([employer]) for co pay assist.    [facility] received forms to be completed for low or no cost drugs. Attempted to call son Tom Coppola, left   to return call. No HIPAA disclosed.

## 2020-09-18 ENCOUNTER — TELEPHONE (OUTPATIENT)
Dept: ONCOLOGY | Age: 85
End: 2020-09-18

## 2020-09-18 NOTE — TELEPHONE ENCOUNTER
HIPAA verified. Requested Son to come into office to sign paperwork for [facility]. Verbalized understanding she will relay message.

## 2020-09-22 NOTE — PROGRESS NOTES
Sukhwinder See a 89 y. o. male who is seen for new malignancy in bone.  Patient has not started the Votrient as of this time waiting for financial assistance. Patient denies complaints, he states he is walking a little better. Patient will start 10 sessions of XRT tomorrow on his Left knee. Key Oncology Meds             PAZOPanib (VOTRIENT) 200 mg tablet Take 2 Tabs by mouth daily. Indications: renal cell carcinoma        Key Pain Meds             traMADoL (ULTRAM) 50 mg tablet Take 50 mg by mouth every eight (8) hours as needed for Pain.

## 2020-09-23 ENCOUNTER — OFFICE VISIT (OUTPATIENT)
Dept: ONCOLOGY | Age: 85
End: 2020-09-23
Payer: MEDICARE

## 2020-09-23 VITALS
RESPIRATION RATE: 16 BRPM | TEMPERATURE: 98.1 F | BODY MASS INDEX: 21.91 KG/M2 | DIASTOLIC BLOOD PRESSURE: 57 MMHG | OXYGEN SATURATION: 94 % | HEIGHT: 67 IN | WEIGHT: 139.6 LBS | HEART RATE: 47 BPM | SYSTOLIC BLOOD PRESSURE: 121 MMHG

## 2020-09-23 DIAGNOSIS — C64.2 RENAL CELL CARCINOMA OF LEFT KIDNEY (HCC): Primary | ICD-10-CM

## 2020-09-23 PROCEDURE — 99214 OFFICE O/P EST MOD 30 MIN: CPT | Performed by: INTERNAL MEDICINE

## 2020-09-23 NOTE — PROGRESS NOTES
Reason for Visit:   Mihir Cadet a 89 y. o. male who is seen for new malignancy in bone.      Treatment History:   Dx: Left Renal Cell Carcinoma--2009--left radical nephrectomy     Dx: Prostate Cancer--no tissue diagnosis--rising PSA--last 27--6/16/2020. Mark Flores symptom is nocturia  Tx: None     Dx: Recurrent Renal Cell Carcinoma--Bone Metastasis--left femur--June 2020--ORIF to pathologic fracture in June 2020. Lung Mets from CT Chest August 2020. Tx: Radiation to femur followed by targeted therapy with pazopanib 400mg daily--see if tolerates  Goal: Palliative    History of Present Illness:   Doing very well, getting radiation, pain is resolved, no changes in physical status. Past Medical History:   Diagnosis Date    BPH (benign prostatic hyperplasia)     Diabetes ([address]     Hypertension     Left renal mass     Prostate cancer ([facility] [address] 2/22/2019      Past Surgical History:   Procedure Laterality Date    HX CATARACT REMOVAL      bilat    HX CYST REMOVAL      right chest.    HX NEPHRECTOMY Left 2009    Dr Gabriela Roper, left renal CA    HX ORTHOPAEDIC Right 02/2020    Humerus ORIF      Social History     Tobacco Use    Smoking status: Never Smoker    Smokeless tobacco: Never Used   Substance Use Topics    Alcohol use: No      Family History   Problem Relation Age of Onset    Stroke Father      Current Outpatient Medications   Medication Sig    aspirin 81 mg chewable tablet Take 81 mg by mouth every other day.  traMADoL (ULTRAM) 50 mg tablet Take 50 mg by mouth every eight (8) hours as needed for Pain.  finasteride (Proscar) 5 mg tablet Take 1 Tab by mouth daily.  lisinopril-hydroCHLOROthiazide (PRINZIDE, ZESTORETIC) 20-12.5 mg per tablet Take 1 Tab by mouth daily. Needs follow up and lab    PAZOPanib (VOTRIENT) 200 mg tablet Take 2 Tabs by mouth daily. Indications: renal cell carcinoma     No current facility-administered medications for this visit.        No Known Allergies Review of Systems: A complete review of systems was obtained, negative except as described above. Physical Exam:     Visit Vitals  BP (!) 121/57   Pulse (!) 47   Temp 98.1 °F (36.7 °C) (Skin)   Resp 16   Ht 5' 7\" (1.702 m)   Wt 139 lb 9.6 oz (63.3 kg)   SpO2 94%   BMI 21.86 kg/m²     ECOG PS: 1  General: No distress  Eyes: PERRLA, anicteric sclerae  HENT: Atraumatic, OP clear  Neck: Supple  Lymphatic: No cervical, supraclavicular, or inguinal adenopathy  Respiratory: CTAB, normal respiratory effort  CV: Normal rate, regular rhythm, no murmurs, no peripheral edema  GI: Soft, nontender, nondistended, no masses, no hepatomegaly, no splenomegaly  MS: Normal gait and station. Digits without clubbing or cyanosis. Skin: No rashes, ecchymoses, or petechiae. Normal temperature, turgor, and texture. Psych: Alert, oriented, appropriate affect, normal judgment/insight    Results:     Lab Results   Component Value Date/Time    WBC 9.1 07/21/2020 06:12 PM    HGB 12.8 07/21/2020 06:12 PM    HCT 39.2 07/21/2020 06:12 PM    PLATELET 517 57/65/5999 06:12 PM    MCV 84.3 07/21/2020 06:12 PM    ABS. NEUTROPHILS 6.3 07/21/2020 06:12 PM     Lab Results   Component Value Date/Time    Sodium 141 07/21/2020 06:12 PM    Potassium 4.4 07/21/2020 06:12 PM    Chloride 103 07/21/2020 06:12 PM    CO2 26 07/21/2020 06:12 PM    Glucose 111 (H) 07/21/2020 06:12 PM    BUN 29 (H) 07/21/2020 06:12 PM    Creatinine 1.54 (H) 07/21/2020 06:12 PM    GFR est AA 51 (L) 07/21/2020 06:12 PM    GFR est non-AA 42 (L) 07/21/2020 06:12 PM    Calcium 9.6 07/21/2020 06:12 PM    Glucose (POC) 121 (H) 06/20/2020 06:00 AM     Lab Results   Component Value Date/Time    Bilirubin, total 1.0 07/21/2020 06:12 PM    ALT (SGPT) 16 07/21/2020 06:12 PM    Alk.  phosphatase 107 07/21/2020 06:12 PM    Protein, total 7.2 07/21/2020 06:12 PM    Albumin 3.8 07/21/2020 06:12 PM    Globulin 3.4 07/21/2020 06:12 PM       Bone Scan 6/17/2020  IMPRESSION: Increased tracer activity corresponding to the lytic lesion in the  left distal femur. Increased tracer activity corresponding to the pathologic  fracture right humerus with prior surgery.     CT A/P 6/17/2020  IMPRESSION:  1. Relative enlargement prostate gland. 2. Surgical absence of the left kidney. 3. Normal sized right kidney. No evidence of urolithiasis or hydronephrotic  change. Presence of a small, focal area of relatively decreased attenuation  involving the lower pole of the right kidney. Further studies recommended. Presence of a small cysts within the right kidney. 4. Presence of an abdominal aortic aneurysm as described above. 5. Presence of a moderate amount of gas and fecal material within the colon. Evidence of sigmoid diverticulosis. No evidence of diverticulitis. 6. Normal sized liver. Presence of multiple focal, well-demarcated areas of  low-density area within the liver suggestive of cysts. 7. Presence of bilateral inguinal hernias (containing normal caliber loops of  Bowel).     CT Chest 8/13/2020  IMPRESSION:  1. Presence of three nodular lesions within the right lung as described above. 2. No definite evidence of mediastinal or hilar lymphadenopathy. 3. Normal appearance of the adrenal glands. 4. Presence of low-density areas within the liver possibly representing cysts. 5. Surgical absence of the left kidney. Normal appearance of the visualized  portions of the remaining right kidney. 6. Presence of moderate coronary artery calcification.   7. Presence of a focal, well-demarcated soft tissue density within the  subcutaneous tissues of the lateral aspect of the right side of the chest  (unchanged in appearance).     Biopsy Left Femur 7/27/2020  CYTOLOGIC INTERPRETATION:   Bone, Left Femur, lesion, CT-guided core biopsy with touch preparation:   Metastatic clear cell renal cell carcinoma   See comment   General Categorization      Assessment:   1) Bone metastasis  2) Renal Cell Carcinoma--lung nodules consistent with mets  3) Prostate Cancer  4) Chest wall nodule     Plan:   1) CT guided biopsy showing metastatic RCC-- Rad Onc giving Palliative Radiation.     2) Small nodules but most likely mets, start low dose pazopanib once radiation is complete and authorized. Follow LFT's and TFT's.    3) Watching for now, slowly progressing PSA.    4) Could be met--would need biopsy to prove--could be cyst vs lipoma.         Signed By: Jimmy Michael MD

## 2020-09-25 ENCOUNTER — TELEPHONE (OUTPATIENT)
Dept: ONCOLOGY | Age: 85
End: 2020-09-25

## 2020-09-25 NOTE — TELEPHONE ENCOUNTER
Received a call from [address] [place] [address] with Patient assistance now Oncology, concerning patient has not met his out of pocket expenses at this time. I relayed to her Patient has a $2100. .. Copay out of pocket for this mediation and cannot afford it. She will send this to the [facility] for assistance. Phone number [phone].     Thanked for call

## 2020-11-05 ENCOUNTER — OFFICE VISIT (OUTPATIENT)
Dept: ONCOLOGY | Age: 85
End: 2020-11-05
Payer: MEDICARE

## 2020-11-05 VITALS
HEIGHT: 67 IN | WEIGHT: 131.8 LBS | DIASTOLIC BLOOD PRESSURE: 76 MMHG | OXYGEN SATURATION: 95 % | HEART RATE: 83 BPM | SYSTOLIC BLOOD PRESSURE: 130 MMHG | TEMPERATURE: 98 F | BODY MASS INDEX: 20.69 KG/M2 | RESPIRATION RATE: 16 BRPM

## 2020-11-05 DIAGNOSIS — R63.4 ABNORMAL WEIGHT LOSS: ICD-10-CM

## 2020-11-05 DIAGNOSIS — C64.2 RENAL CELL CARCINOMA OF LEFT KIDNEY (HCC): Primary | ICD-10-CM

## 2020-11-05 PROCEDURE — 99214 OFFICE O/P EST MOD 30 MIN: CPT | Performed by: INTERNAL MEDICINE

## 2020-11-05 RX ORDER — ASPIRIN 325 MG
325 TABLET ORAL
COMMUNITY
End: 2020-11-16

## 2020-11-05 NOTE — PROGRESS NOTES
Reason for Visit:   Reno Morales a 89 y. o. male who is seen for new malignancy in bone.      Treatment History:   Dx: Left Renal Cell Carcinoma--2009--left radical nephrectomy     Dx: Prostate Cancer--no tissue diagnosis--rising PSA--last 27--6/16/2020. Fleeta Aures symptom is nocturia  Tx: None     Dx: Recurrent Renal Cell Carcinoma--Bone Metastasis--left femur--June 2020--ORIF to pathologic fracture in June 2020.  Lung Mets from CT Chest August 2020. Tx: Radiation to femur followed by targeted therapy with pazopanib 400mg daily--did not tolerate due to nausea/diarrhea/wt loss. Goal: Palliative    History of Present Illness:   Problems with nausea and wt loss. Also weaker overall. Wheel chair today. Past Medical History:   Diagnosis Date    BPH (benign prostatic hyperplasia)     Diabetes ([facility] [address]     Hypertension     Left renal mass     Prostate cancer ([facility] [address] 2/22/2019      Past Surgical History:   Procedure Laterality Date    HX CATARACT REMOVAL      bilat    HX CYST REMOVAL      right chest.    HX NEPHRECTOMY Left 2009    Dr Cate Perry, left renal CA    HX ORTHOPAEDIC Right 02/2020    Humerus ORIF      Social History     Tobacco Use    Smoking status: Never Smoker    Smokeless tobacco: Never Used   Substance Use Topics    Alcohol use: No      Family History   Problem Relation Age of Onset    Stroke Father      Current Outpatient Medications   Medication Sig    aspirin (ASPIRIN) 325 mg tablet Take 325 mg by mouth daily as needed for Pain.  aspirin 81 mg chewable tablet Take 81 mg by mouth every other day.  finasteride (Proscar) 5 mg tablet Take 1 Tab by mouth daily.  lisinopril-hydroCHLOROthiazide (PRINZIDE, ZESTORETIC) 20-12.5 mg per tablet Take 1 Tab by mouth daily. Needs follow up and lab    PAZOPanib (VOTRIENT) 200 mg tablet Take 2 Tabs by mouth daily.  Indications: renal cell carcinoma    traMADoL (ULTRAM) 50 mg tablet Take 50 mg by mouth every eight (8) hours as needed for Pain. No current facility-administered medications for this visit. No Known Allergies     Review of Systems: A complete review of systems was obtained, negative except as described above. Physical Exam:     Visit Vitals  /76   Pulse 83   Temp 98 °F (36.7 °C) (Skin)   Resp 16   Ht 5' 7\" (1.702 m)   Wt 131 lb 12.8 oz (59.8 kg)   SpO2 95%   BMI 20.64 kg/m²     ECOG PS: 2-3  General: No distress  Eyes: PERRLA, anicteric sclerae  HENT: Atraumatic, OP clear  Neck: Supple  Lymphatic: No cervical, supraclavicular, or inguinal adenopathy  Respiratory: CTAB, normal respiratory effort  CV: Normal rate, regular rhythm, no murmurs, no peripheral edema  GI: Soft, nontender, nondistended, no masses, no hepatomegaly, no splenomegaly  MS: Normal gait and station. Digits without clubbing or cyanosis. Skin: No rashes, ecchymoses, or petechiae. Normal temperature, turgor, and texture. Psych: Alert, oriented, appropriate affect, normal judgment/insight    Results:     Lab Results   Component Value Date/Time    WBC 9.1 07/21/2020 06:12 PM    HGB 12.8 07/21/2020 06:12 PM    HCT 39.2 07/21/2020 06:12 PM    PLATELET 571 34/20/6572 06:12 PM    MCV 84.3 07/21/2020 06:12 PM    ABS. NEUTROPHILS 6.3 07/21/2020 06:12 PM     Lab Results   Component Value Date/Time    Sodium 141 07/21/2020 06:12 PM    Potassium 4.4 07/21/2020 06:12 PM    Chloride 103 07/21/2020 06:12 PM    CO2 26 07/21/2020 06:12 PM    Glucose 111 (H) 07/21/2020 06:12 PM    BUN 29 (H) 07/21/2020 06:12 PM    Creatinine 1.54 (H) 07/21/2020 06:12 PM    GFR est AA 51 (L) 07/21/2020 06:12 PM    GFR est non-AA 42 (L) 07/21/2020 06:12 PM    Calcium 9.6 07/21/2020 06:12 PM    Glucose (POC) 121 (H) 06/20/2020 06:00 AM     Lab Results   Component Value Date/Time    Bilirubin, total 1.0 07/21/2020 06:12 PM    ALT (SGPT) 16 07/21/2020 06:12 PM    Alk.  phosphatase 107 07/21/2020 06:12 PM    Protein, total 7.2 07/21/2020 06:12 PM    Albumin 3.8 07/21/2020 06:12 PM Globulin 3.4 07/21/2020 06:12 PM       Bone Scan 6/17/2020  IMPRESSION: Increased tracer activity corresponding to the lytic lesion in the  left distal femur. Increased tracer activity corresponding to the pathologic  fracture right humerus with prior surgery.     CT A/P 6/17/2020  IMPRESSION:  1. Relative enlargement prostate gland. 2. Surgical absence of the left kidney. 3. Normal sized right kidney. No evidence of urolithiasis or hydronephrotic  change. Presence of a small, focal area of relatively decreased attenuation  involving the lower pole of the right kidney. Further studies recommended. Presence of a small cysts within the right kidney. 4. Presence of an abdominal aortic aneurysm as described above. 5. Presence of a moderate amount of gas and fecal material within the colon. Evidence of sigmoid diverticulosis. No evidence of diverticulitis. 6. Normal sized liver. Presence of multiple focal, well-demarcated areas of  low-density area within the liver suggestive of cysts. 7. Presence of bilateral inguinal hernias (containing normal caliber loops of  Bowel).     CT Chest 8/13/2020  IMPRESSION:  1. Presence of three nodular lesions within the right lung as described above. 2. No definite evidence of mediastinal or hilar lymphadenopathy. 3. Normal appearance of the adrenal glands. 4. Presence of low-density areas within the liver possibly representing cysts. 5. Surgical absence of the left kidney. Normal appearance of the visualized  portions of the remaining right kidney. 6. Presence of moderate coronary artery calcification.   7. Presence of a focal, well-demarcated soft tissue density within the  subcutaneous tissues of the lateral aspect of the right side of the chest  (unchanged in appearance).     Biopsy Left Femur 7/27/2020  CYTOLOGIC INTERPRETATION:   Bone, Left Femur, lesion, CT-guided core biopsy with touch preparation:   Metastatic clear cell renal cell carcinoma   See comment General Categorization      Assessment:   1) Bone metastasis  2) Renal Cell Carcinoma--lung nodules consistent with mets  3) Prostate Cancer  4) Chest wall nodule     Plan:   1) CT guided biopsy showing metastatic RCC-- Rad Onc gave Palliative Radiation.  Did not tolerate pazopanib--stop and return in month to re-eval.  Hospice is also viable option and was discussed. 2) Small nodules but most likely mets, start low dose pazopanib once radiation is complete and authorized. Follow LFT's and TFT's.    3) Watching for now, slowly progressing PSA.    4) Could be met--would need biopsy to prove--could be cyst vs lipoma.           Signed By: Guadalupe Baxter MD

## 2020-11-05 NOTE — PROGRESS NOTES
Juni Ravi a 89 y. o. male who is seen for new malignancy in bone/Recurrent Renal Cell Carcinoma. Luli stopped Votrient 2 days - 1 week ago because he began to feel bad, nausea, and diarrhea. He still feels bad, nausea. Is not eating well has lost approximately 8 lbs. Key Oncology Meds             PAZOPanib (VOTRIENT) 200 mg tablet Take 2 Tabs by mouth daily. Indications: renal cell carcinoma          Key Pain Meds             traMADoL (ULTRAM) 50 mg tablet Take 50 mg by mouth every eight (8) hours as needed for Pain.

## 2020-11-09 ENCOUNTER — TELEPHONE (OUTPATIENT)
Dept: ONCOLOGY | Age: 85
End: 2020-11-09

## 2020-11-09 PROBLEM — T50.905A DRUG-INDUCED CHOLESTATIC HEPATITIS: Status: ACTIVE | Noted: 2020-11-09

## 2020-11-09 PROBLEM — K75.89 DRUG-INDUCED CHOLESTATIC HEPATITIS: Status: ACTIVE | Noted: 2020-11-09

## 2020-11-09 PROBLEM — N17.9 AKI (ACUTE KIDNEY INJURY) (HCC): Status: ACTIVE | Noted: 2020-11-09

## 2020-11-09 NOTE — TELEPHONE ENCOUNTER
Spoke with Patients Son Ave Navarro, he states patient is drinking liquids but not a lot, he feels his stomach is numb so he is not eating. Son stated today when he saw his father his skin is yellow, whites of eyes are not yellow. Called Dr Sammi Atkins, relayed message from son, per DR Sammi Atkins patient is to go to ED. Called Mr Torres Espinoza (son) relayed message that patient should go to ED. He verbalized understanding, stated they would be leaving soon for ER. Called ED, relayed message to Nurse Ruben May) concerning. Reviewed labs, medication including Votrient and recent s/s.

## 2020-11-09 NOTE — TELEPHONE ENCOUNTER
Brigido Moreno called. Stated that patient is having side effects, he states that patient is Jaundice.  Please call Joseph Clark @ [phone]

## 2020-11-16 ENCOUNTER — HOME HEALTH ADMISSION (OUTPATIENT)
Dept: HOME HEALTH SERVICES | Facility: HOME HEALTH | Age: 85
End: 2020-11-16

## 2020-11-17 ENCOUNTER — TELEPHONE (OUTPATIENT)
Dept: CASE MANAGEMENT | Age: 85
End: 2020-11-17

## 2020-11-17 NOTE — TELEPHONE ENCOUNTER
20 3:46 PM--attempted to reach pt but no one is answering the phone at this time. The home phone contains a VM message with the family name so I left a message there introducing myself, the purpose of my call, and my return call phone #. If I don't hear from pt or his family, one more attempt will be made tomorrow afternoon. 20 12:15 PM     Patient contacted regarding recent discharge and COVID-19 risk. Discussed COVID-19 related testing which was available at this time. Test results were negative. Patient informed of results, if available? yes    Care Transition Nurse/ Ambulatory Care Manager/ LPN Care Coordinator contacted the family by telephone to perform post discharge assessment. Verified name and  with family as identifiers. Patient has following risk factors of: immunocompromised, diabetes and chronic kidney disease. CTN/ACM/LPN reviewed discharge instructions, medical action plan and red flags related to discharge diagnosis. Reviewed and educated them on any new and changed medications related to discharge diagnosis. Son reports that all but one D/C medication have been obtained and the remaining med will be available today. He confirms F/U appts on  and  with physicians. Advance Care Planning:   Does patient have an Advance Directive: currently not on file; education provided ; son states he has this document on file at home. Primary Decision Maker: Hao Chiu" - Child - [phone]    Secondary Decision Maker: Tyronepasha Hayes - Daughter-in-Law - [phone]    Education provided regarding infection prevention, and signs and symptoms of COVID-19 and when to seek medical attention with family who verbalized understanding. Discussed exposure protocols and quarantine from [address] you at higher risk for severe illness  and given an opportunity for questions and concerns.  The family agrees to contact the COVID-19 hotline [phone] or PCP office for questions related to their healthcare. CTN/ACM/LPN provided contact information for future reference. From CDC: Are you at higher risk for severe illness?  Wash your hands often.  Avoid close contact (6 feet, which is about two arm lengths) with people who are sick.  Put distance between yourself and other people if COVID-19 is spreading in your community.  Clean and disinfect frequently touched surfaces.  Avoid all cruise travel and non-essential air travel.  Call your healthcare professional if you have concerns about COVID-19 and your underlying condition or if you are sick. For more information on steps you can take to protect yourself, see CDC's How to Protect Yourself      Patient/family/caregiver given information for [employer] Loop and agrees to enroll no, son declined  Patient's preferred e-mail:  N/A  Patient's preferred phone number: N/A  Based on Loop alert triggers, patient will be contacted by nurse care manager for worsening symptoms. Plan for follow-up call in 14 days based on severity of symptoms and risk factors.

## 2020-11-17 NOTE — PROGRESS NOTES
Franchesca Sagastume a 89 y. o. male who is seen for malignancy in bone.   Patient skin is yellow, patient has decreased appetite, per his son eating pudding and jello. Per son he is up in a chair 1-2 hours a day. Son is helping him to bath, feed him.

## 2020-11-19 PROBLEM — S72.302A CLOSED FRACTURE OF SHAFT OF LEFT FEMUR, INITIAL ENCOUNTER (HCC): Status: RESOLVED | Noted: 2020-06-16 | Resolved: 2020-11-19

## 2020-11-20 ENCOUNTER — OFFICE VISIT (OUTPATIENT)
Dept: ONCOLOGY | Age: 85
End: 2020-11-20
Payer: MEDICARE

## 2020-11-20 VITALS
SYSTOLIC BLOOD PRESSURE: 117 MMHG | WEIGHT: 138.8 LBS | TEMPERATURE: 97.5 F | HEART RATE: 89 BPM | BODY MASS INDEX: 23.13 KG/M2 | DIASTOLIC BLOOD PRESSURE: 73 MMHG | RESPIRATION RATE: 20 BRPM | OXYGEN SATURATION: 93 % | HEIGHT: 65 IN

## 2020-11-20 DIAGNOSIS — C64.2 RENAL CELL CARCINOMA OF LEFT KIDNEY (HCC): Primary | ICD-10-CM

## 2020-11-20 PROCEDURE — 99214 OFFICE O/P EST MOD 30 MIN: CPT | Performed by: INTERNAL MEDICINE

## 2020-11-20 RX ORDER — LOPERAMIDE HYDROCHLORIDE 2 MG/1
CAPSULE ORAL
COMMUNITY

## 2020-11-20 NOTE — PROGRESS NOTES
Reason for Visit:   Juliette Newby a 89 y. o. male who is seen for new malignancy in bone.      Treatment History:   Dx: Left Renal Cell Carcinoma--2009--left radical nephrectomy     Dx: Prostate Cancer--no tissue diagnosis--rising PSA--last 27--6/16/2020. Laurie Santana symptom is nocturia  Tx: None     Dx: Recurrent Renal Cell Carcinoma--Bone Metastasis--left femur--June 2020--ORIF to pathologic fracture in June 2020.  Lung Mets from CT Chest August 2020. Tx: Radiation to femur followed by targeted therapy with pazopanib 400mg daily--did not tolerate due to nausea/diarrhea/wt loss. Goal: Palliative       History of Present Illness:   Had acute liver failure from Pazopanib--hospitalized--still very jaundiced. Resting more than half the day and requiring help up and around. Son doing the help. No pain, no Itching, thinking clearly. Past Medical History:   Diagnosis Date    BPH (benign prostatic hyperplasia)     Closed fracture of shaft of left femur, initial encounter ([facility] [address] 6/16/2020    Diabetes ([facility] [address]     Hypertension     Left renal mass     Prostate cancer ([facility] [address] 2/22/2019      Past Surgical History:   Procedure Laterality Date    HX CATARACT REMOVAL      bilat    HX CYST REMOVAL      right chest.    HX NEPHRECTOMY Left 2009    Dr Marcos Dunn, left renal CA    HX ORTHOPAEDIC Right 02/2020    Humerus ORIF      Social History     Tobacco Use    Smoking status: Never Smoker    Smokeless tobacco: Never Used   Substance Use Topics    Alcohol use: No      Family History   Problem Relation Age of Onset    Stroke Father      Current Outpatient Medications   Medication Sig    loperamide (IMODIUM) 2 mg capsule Take  by mouth.  artificial tears, dextran-hypromellose-glycerin, (GENTEAL) 0.1-0.3-0.2 % ophthalmic solution Administer 2 Drops to both eyes three (3) times daily as needed (dryness or itching).     bacitracin-polymyxin b (POLYSPORIN) ophthalmic ointment apply to affected eye twice daily for 5 days  Indications: pink eye from bacterial infection    famotidine (PEPCID) 20 mg tablet Take 1 Tab by mouth every evening.  finasteride (Proscar) 5 mg tablet Take 1 Tab by mouth daily.  polyethylene glycol (MIRALAX) 17 gram packet Take 1 Packet by mouth daily as needed for Constipation.  aspirin 81 mg chewable tablet Take 81 mg by mouth every other day. No current facility-administered medications for this visit. No Known Allergies     Review of Systems: A complete review of systems was obtained, negative except as described above. Physical Exam:     Visit Vitals  /73   Pulse 89   Temp 97.5 °F (36.4 °C) (Oral)   Resp 20   Ht 5' 5\" (1.651 m)   Wt 138 lb 12.8 oz (63 kg)   SpO2 93%   BMI 23.10 kg/m²     ECOG PS: 3  General: No distress  Eyes: PERRLA, anicteric sclerae  HENT: Atraumatic, OP clear  Neck: Supple  Lymphatic: No cervical, supraclavicular, or inguinal adenopathy  Respiratory: CTAB, normal respiratory effort  CV: Normal rate, regular rhythm, no murmurs, no peripheral edema  GI: Soft, nontender, nondistended, no masses, no hepatomegaly, no splenomegaly  MS: Normal gait and station. Digits without clubbing or cyanosis. Skin: No rashes, ecchymoses, or petechiae. Normal temperature, turgor, and texture. Psych: Alert, oriented, appropriate affect, normal judgment/insight    Results:     Lab Results   Component Value Date/Time    WBC 5.9 11/14/2020 06:00 AM    HGB 11.9 (L) 11/14/2020 06:00 AM    HCT 33.8 (L) 11/14/2020 06:00 AM    PLATELET 72 (L) 34/14/4635 06:00 AM    MCV 79.5 (L) 11/14/2020 06:00 AM    ABS.  NEUTROPHILS 3.3 11/09/2020 01:30 PM     Lab Results   Component Value Date/Time    Sodium 139 11/15/2020 08:15 AM    Potassium 3.7 11/15/2020 08:15 AM    Chloride 104 11/15/2020 08:15 AM    CO2 20 (L) 11/15/2020 08:15 AM    Glucose 104 (H) 11/15/2020 08:15 AM    BUN 21 (H) 11/15/2020 08:15 AM    Creatinine 1.59 (H) 11/15/2020 08:15 AM    GFR est AA 49 (L) 11/15/2020 08:15 AM    GFR est non-AA 41 (L) 11/15/2020 08:15 AM    Calcium 8.1 (L) 11/15/2020 08:15 AM    Glucose (POC) 121 (H) 06/20/2020 06:00 AM     Lab Results   Component Value Date/Time    Bilirubin, total 24.3 (H) 11/15/2020 08:15 AM    ALT (SGPT) 662 (H) 11/15/2020 08:15 AM    Alk. phosphatase 735 (H) 11/15/2020 08:15 AM    Protein, total 4.7 (L) 11/15/2020 08:15 AM    Albumin 2.0 (L) 11/15/2020 08:15 AM    Globulin 2.7 11/15/2020 08:15 AM       Bone Scan 6/17/2020  IMPRESSION: Increased tracer activity corresponding to the lytic lesion in the  left distal femur. Increased tracer activity corresponding to the pathologic  fracture right humerus with prior surgery.     CT A/P 6/17/2020  IMPRESSION:  1. Relative enlargement prostate gland. 2. Surgical absence of the left kidney. 3. Normal sized right kidney. No evidence of urolithiasis or hydronephrotic  change. Presence of a small, focal area of relatively decreased attenuation  involving the lower pole of the right kidney. Further studies recommended. Presence of a small cysts within the right kidney. 4. Presence of an abdominal aortic aneurysm as described above. 5. Presence of a moderate amount of gas and fecal material within the colon. Evidence of sigmoid diverticulosis. No evidence of diverticulitis. 6. Normal sized liver. Presence of multiple focal, well-demarcated areas of  low-density area within the liver suggestive of cysts. 7. Presence of bilateral inguinal hernias (containing normal caliber loops of  Bowel).     CT Chest 8/13/2020  IMPRESSION:  1. Presence of three nodular lesions within the right lung as described above. 2. No definite evidence of mediastinal or hilar lymphadenopathy. 3. Normal appearance of the adrenal glands. 4. Presence of low-density areas within the liver possibly representing cysts. 5. Surgical absence of the left kidney. Normal appearance of the visualized  portions of the remaining right kidney.   6. Presence of moderate coronary artery calcification. 7. Presence of a focal, well-demarcated soft tissue density within the  subcutaneous tissues of the lateral aspect of the right side of the chest  (unchanged in appearance).     Biopsy Left Femur 7/27/2020  CYTOLOGIC INTERPRETATION:   Bone, Left Femur, lesion, CT-guided core biopsy with touch preparation:   Metastatic clear cell renal cell carcinoma   See comment   General Categorization      Assessment:   1) Bone metastasis  2) Renal Cell Carcinoma--lung nodules consistent with mets  3) Prostate Cancer  4) Chest wall nodule     Plan:   1) Did not tolerate pazopanib--Hospice most appropriate and they agree. 2) Small nodules but most likely mets    3) Watching for now, slowly progressing PSA.    4) Could be met--would need biopsy to prove          Signed By: Montse Cancino MD

## 2020-11-23 ENCOUNTER — HOME CARE VISIT (OUTPATIENT)
Dept: HOME HEALTH SERVICES | Facility: HOME HEALTH | Age: 85
End: 2020-11-23

## 2020-12-07 ENCOUNTER — TELEPHONE (OUTPATIENT)
Dept: CASE MANAGEMENT | Age: 85
End: 2020-12-07

## 2020-12-07 NOTE — TELEPHONE ENCOUNTER
12/7/20 6:20 PM     Called to check on pt and spoke to pt's son who advised he is now in hospice care. I thanked him for the information, said I hope all will go as well as possible for pt and family, and we disconnected.

## 2022-03-18 PROBLEM — T50.905A DRUG-INDUCED CHOLESTATIC HEPATITIS: Status: ACTIVE | Noted: 2020-11-09

## 2022-03-18 PROBLEM — C64.2 RENAL CELL CARCINOMA OF LEFT KIDNEY (HCC): Status: ACTIVE | Noted: 2020-06-16

## 2022-03-18 PROBLEM — K75.89 DRUG-INDUCED CHOLESTATIC HEPATITIS: Status: ACTIVE | Noted: 2020-11-09

## 2022-03-19 PROBLEM — I10 ESSENTIAL HYPERTENSION: Status: ACTIVE | Noted: 2018-01-24

## 2022-03-19 PROBLEM — C61 PROSTATE CANCER (HCC): Status: ACTIVE | Noted: 2019-02-22

## 2022-03-19 PROBLEM — N40.1 BPH WITH OBSTRUCTION/LOWER URINARY TRACT SYMPTOMS: Status: ACTIVE | Noted: 2018-01-24

## 2022-03-19 PROBLEM — E11.9 CONTROLLED TYPE 2 DIABETES MELLITUS WITHOUT COMPLICATION, WITHOUT LONG-TERM CURRENT USE OF INSULIN (HCC): Status: ACTIVE | Noted: 2018-01-24

## 2022-03-19 PROBLEM — N13.8 BPH WITH OBSTRUCTION/LOWER URINARY TRACT SYMPTOMS: Status: ACTIVE | Noted: 2018-01-24

## 2022-03-19 PROBLEM — I71.40 ABDOMINAL AORTIC ANEURYSM (AAA) 3.0 CM TO 5.5 CM IN DIAMETER IN MALE (HCC): Status: ACTIVE | Noted: 2020-06-17

## 2022-03-19 PROBLEM — M89.9 LYTIC BONE LESION OF LEFT FEMUR: Status: ACTIVE | Noted: 2020-06-11

## 2022-03-20 PROBLEM — N17.9 AKI (ACUTE KIDNEY INJURY) (HCC): Status: ACTIVE | Noted: 2020-11-09

## 2023-05-11 RX ORDER — FINASTERIDE 5 MG/1
TABLET, FILM COATED ORAL DAILY
COMMUNITY
Start: 2020-03-11

## 2023-05-11 RX ORDER — BACITRACIN ZINC AND POLYMYXIN B SULFATE 500; 10000 [USP'U]/G; [USP'U]/G
OINTMENT OPHTHALMIC
COMMUNITY
Start: 2020-11-16

## 2023-05-11 RX ORDER — DEXTRAN 70/HYPROMELLOSE
2 DROPS OPHTHALMIC (EYE) 3 TIMES DAILY PRN
COMMUNITY
Start: 2020-11-16

## 2023-05-11 RX ORDER — ASPIRIN 81 MG/1
TABLET, CHEWABLE ORAL EVERY OTHER DAY
COMMUNITY
Start: 2020-06-24

## 2023-05-11 RX ORDER — LOPERAMIDE HYDROCHLORIDE 2 MG/1
CAPSULE ORAL
COMMUNITY

## 2023-05-11 RX ORDER — FAMOTIDINE 20 MG/1
TABLET, FILM COATED ORAL EVERY EVENING
COMMUNITY
Start: 2020-11-16

## 2023-05-11 RX ORDER — POLYETHYLENE GLYCOL 3350 17 G/17G
POWDER, FOR SOLUTION ORAL DAILY PRN
COMMUNITY
Start: 2020-11-16